# Patient Record
Sex: MALE | Race: WHITE | NOT HISPANIC OR LATINO | Employment: OTHER | URBAN - METROPOLITAN AREA
[De-identification: names, ages, dates, MRNs, and addresses within clinical notes are randomized per-mention and may not be internally consistent; named-entity substitution may affect disease eponyms.]

---

## 2022-10-19 ENCOUNTER — HOSPITAL ENCOUNTER (EMERGENCY)
Facility: HOSPITAL | Age: 32
Discharge: HOME | End: 2022-10-19
Attending: EMERGENCY MEDICINE
Payer: OTHER GOVERNMENT

## 2022-10-19 VITALS
WEIGHT: 175 LBS | SYSTOLIC BLOOD PRESSURE: 137 MMHG | DIASTOLIC BLOOD PRESSURE: 84 MMHG | TEMPERATURE: 98 F | RESPIRATION RATE: 20 BRPM | OXYGEN SATURATION: 98 % | HEART RATE: 88 BPM

## 2022-10-19 DIAGNOSIS — N28.1 RENAL CYST: Primary | ICD-10-CM

## 2022-10-19 DIAGNOSIS — S76.011A HIP STRAIN, RIGHT, INITIAL ENCOUNTER: ICD-10-CM

## 2022-10-19 DIAGNOSIS — S22.31XA CLOSED FRACTURE OF ONE RIB OF RIGHT SIDE, INITIAL ENCOUNTER: ICD-10-CM

## 2022-10-19 DIAGNOSIS — S16.1XXA CERVICAL STRAIN, ACUTE, INITIAL ENCOUNTER: ICD-10-CM

## 2022-10-19 LAB
ANION GAP SERPL CALC-SCNC: 17 MMOL/L (ref 8–16)
BUN SERPL-MCNC: 17 MG/DL (ref 6–30)
CHLORIDE SERPL-SCNC: 102 MMOL/L (ref 95–110)
CREAT SERPL-MCNC: 1.2 MG/DL (ref 0.5–1.4)
GLUCOSE SERPL-MCNC: 91 MG/DL (ref 70–110)
HCT VFR BLD CALC: 45 %PCV (ref 36–54)
POC IONIZED CALCIUM: 1.33 MMOL/L (ref 1.06–1.42)
POC TCO2 (MEASURED): 29 MMOL/L (ref 23–29)
POTASSIUM BLD-SCNC: 3.9 MMOL/L (ref 3.5–5.1)
SAMPLE: ABNORMAL
SODIUM BLD-SCNC: 143 MMOL/L (ref 136–145)

## 2022-10-19 PROCEDURE — 25500020 PHARM REV CODE 255: Performed by: EMERGENCY MEDICINE

## 2022-10-19 PROCEDURE — 99900035 HC TECH TIME PER 15 MIN (STAT)

## 2022-10-19 PROCEDURE — 85014 HEMATOCRIT: CPT

## 2022-10-19 PROCEDURE — 82565 ASSAY OF CREATININE: CPT

## 2022-10-19 PROCEDURE — 82330 ASSAY OF CALCIUM: CPT

## 2022-10-19 PROCEDURE — 84295 ASSAY OF SERUM SODIUM: CPT

## 2022-10-19 PROCEDURE — 84132 ASSAY OF SERUM POTASSIUM: CPT

## 2022-10-19 PROCEDURE — 99285 EMERGENCY DEPT VISIT HI MDM: CPT | Mod: 25

## 2022-10-19 RX ORDER — IBUPROFEN 600 MG/1
600 TABLET ORAL EVERY 6 HOURS PRN
Qty: 20 TABLET | Refills: 0 | Status: ON HOLD | OUTPATIENT
Start: 2022-10-19 | End: 2024-01-18 | Stop reason: HOSPADM

## 2022-10-19 RX ORDER — HYDROCODONE BITARTRATE AND ACETAMINOPHEN 5; 325 MG/1; MG/1
1 TABLET ORAL EVERY 4 HOURS PRN
Qty: 12 TABLET | Refills: 0 | Status: SHIPPED | OUTPATIENT
Start: 2022-10-19 | End: 2022-10-29

## 2022-10-19 RX ADMIN — IOHEXOL 100 ML: 350 INJECTION, SOLUTION INTRAVENOUS at 04:10
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
## 2022-10-19 NOTE — DISCHARGE INSTRUCTIONS
Please follow-up with the orthopedic surgeon above.  Please follow-up with the clinic above.  You have a renal cyst, possible right rib fracture, but no it significant intra-abdominal trauma.  There were no abnormalities noted related to your hip.  Ibuprofen and Tylenol with hydrocodone for pain.  Rest.

## 2022-11-22 ENCOUNTER — TELEPHONE (OUTPATIENT)
Dept: SPORTS MEDICINE | Facility: CLINIC | Age: 32
End: 2022-11-22
Payer: OTHER GOVERNMENT
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
## 2023-03-24 ENCOUNTER — HOSPITAL ENCOUNTER (EMERGENCY)
Facility: HOSPITAL | Age: 33
Discharge: HOME | End: 2023-03-24
Attending: EMERGENCY MEDICINE
Payer: OTHER GOVERNMENT

## 2023-03-24 VITALS
SYSTOLIC BLOOD PRESSURE: 130 MMHG | OXYGEN SATURATION: 100 % | RESPIRATION RATE: 16 BRPM | DIASTOLIC BLOOD PRESSURE: 77 MMHG | HEART RATE: 77 BPM | TEMPERATURE: 97 F | BODY MASS INDEX: 25.9 KG/M2 | WEIGHT: 185 LBS | HEIGHT: 71 IN

## 2023-03-24 DIAGNOSIS — Z87.39 HISTORY OF HERNIATED INTERVERTEBRAL DISC: ICD-10-CM

## 2023-03-24 DIAGNOSIS — G89.29 ACUTE EXACERBATION OF CHRONIC LOW BACK PAIN: Primary | ICD-10-CM

## 2023-03-24 DIAGNOSIS — M54.50 ACUTE EXACERBATION OF CHRONIC LOW BACK PAIN: Primary | ICD-10-CM

## 2023-03-24 DIAGNOSIS — M47.816 FACET ARTHRITIS OF LUMBAR REGION: ICD-10-CM

## 2023-03-24 PROBLEM — F32.A DEPRESSION, UNSPECIFIED: Status: ACTIVE | Noted: 2023-02-16

## 2023-03-24 PROCEDURE — 99284 EMERGENCY DEPT VISIT MOD MDM: CPT

## 2023-03-24 PROCEDURE — 63600175 PHARM REV CODE 636 W HCPCS: Performed by: PHYSICIAN ASSISTANT

## 2023-03-24 PROCEDURE — 96372 THER/PROPH/DIAG INJ SC/IM: CPT | Performed by: PHYSICIAN ASSISTANT

## 2023-03-24 RX ORDER — KETOROLAC TROMETHAMINE 30 MG/ML
30 INJECTION, SOLUTION INTRAMUSCULAR; INTRAVENOUS
Status: COMPLETED | OUTPATIENT
Start: 2023-03-24 | End: 2023-03-24

## 2023-03-24 RX ORDER — PREDNISONE 20 MG/1
60 TABLET ORAL DAILY
Qty: 9 TABLET | Refills: 0 | Status: SHIPPED | OUTPATIENT
Start: 2023-03-24 | End: 2023-03-27

## 2023-03-24 RX ORDER — HYDROCODONE BITARTRATE AND ACETAMINOPHEN 5; 325 MG/1; MG/1
1 TABLET ORAL EVERY 6 HOURS PRN
Qty: 12 TABLET | Refills: 0 | Status: SHIPPED | OUTPATIENT
Start: 2023-03-24 | End: 2023-03-27

## 2023-03-24 RX ORDER — MELOXICAM 7.5 MG/1
7.5 TABLET ORAL DAILY
Qty: 12 TABLET | Refills: 0 | Status: SHIPPED | OUTPATIENT
Start: 2023-03-24

## 2023-03-24 RX ORDER — PREDNISONE 20 MG/1
60 TABLET ORAL
Status: COMPLETED | OUTPATIENT
Start: 2023-03-24 | End: 2023-03-24

## 2023-03-24 RX ADMIN — PREDNISONE 60 MG: 20 TABLET ORAL at 11:03

## 2023-03-24 RX ADMIN — KETOROLAC TROMETHAMINE 30 MG: 30 INJECTION, SOLUTION INTRAMUSCULAR at 11:03
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
## 2023-03-24 NOTE — DISCHARGE INSTRUCTIONS
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
## 2023-04-20 ENCOUNTER — OFFICE VISIT (OUTPATIENT)
Dept: SURGERY | Facility: CLINIC | Age: 33
End: 2023-04-20
Payer: OTHER GOVERNMENT

## 2023-04-20 VITALS
BODY MASS INDEX: 26.78 KG/M2 | DIASTOLIC BLOOD PRESSURE: 76 MMHG | HEART RATE: 65 BPM | WEIGHT: 191.31 LBS | SYSTOLIC BLOOD PRESSURE: 117 MMHG | HEIGHT: 71 IN

## 2023-04-20 DIAGNOSIS — Z87.2 HISTORY OF PILONIDAL CYST: ICD-10-CM

## 2023-04-20 DIAGNOSIS — K62.89 ANAL PAIN: Primary | ICD-10-CM

## 2023-04-20 PROCEDURE — 99203 OFFICE O/P NEW LOW 30 MIN: CPT | Mod: S$PBB,,, | Performed by: NURSE PRACTITIONER

## 2023-04-20 PROCEDURE — 99213 OFFICE O/P EST LOW 20 MIN: CPT | Mod: PBBFAC | Performed by: NURSE PRACTITIONER

## 2023-04-20 PROCEDURE — 99999 PR PBB SHADOW E&M-EST. PATIENT-LVL III: ICD-10-PCS | Mod: PBBFAC,,, | Performed by: NURSE PRACTITIONER

## 2023-04-20 PROCEDURE — 99999 PR PBB SHADOW E&M-EST. PATIENT-LVL III: CPT | Mod: PBBFAC,,, | Performed by: NURSE PRACTITIONER

## 2023-04-20 PROCEDURE — 99203 PR OFFICE/OUTPT VISIT, NEW, LEVL III, 30-44 MIN: ICD-10-PCS | Mod: S$PBB,,, | Performed by: NURSE PRACTITIONER

## 2023-04-20 RX ORDER — BUPROPION HYDROCHLORIDE 150 MG/1
TABLET ORAL
COMMUNITY
Start: 2022-04-25 | End: 2023-12-19

## 2023-04-21 ENCOUNTER — OFFICE VISIT (OUTPATIENT)
Dept: SURGERY | Facility: CLINIC | Age: 33
End: 2023-04-21
Payer: OTHER GOVERNMENT

## 2023-04-21 VITALS
HEART RATE: 92 BPM | WEIGHT: 192.81 LBS | BODY MASS INDEX: 26.99 KG/M2 | SYSTOLIC BLOOD PRESSURE: 136 MMHG | DIASTOLIC BLOOD PRESSURE: 72 MMHG | HEIGHT: 71 IN

## 2023-04-21 DIAGNOSIS — K64.9 HEMORRHOIDS, UNSPECIFIED HEMORRHOID TYPE: Primary | ICD-10-CM

## 2023-04-21 DIAGNOSIS — K59.00 CONSTIPATION, UNSPECIFIED CONSTIPATION TYPE: ICD-10-CM

## 2023-04-21 DIAGNOSIS — K60.3 FISTULA-IN-ANO: ICD-10-CM

## 2023-04-21 DIAGNOSIS — K60.2 ANAL FISSURE: Primary | ICD-10-CM

## 2023-04-21 PROBLEM — K60.30 FISTULA-IN-ANO: Status: ACTIVE | Noted: 2023-04-21

## 2023-04-21 PROCEDURE — 99999 PR PBB SHADOW E&M-EST. PATIENT-LVL III: CPT | Mod: PBBFAC,,, | Performed by: SURGERY

## 2023-04-21 PROCEDURE — 99213 OFFICE O/P EST LOW 20 MIN: CPT | Mod: PBBFAC | Performed by: SURGERY

## 2023-04-21 PROCEDURE — 99999 PR PBB SHADOW E&M-EST. PATIENT-LVL III: ICD-10-PCS | Mod: PBBFAC,,, | Performed by: SURGERY

## 2023-04-21 PROCEDURE — 99214 PR OFFICE/OUTPT VISIT, EST, LEVL IV, 30-39 MIN: ICD-10-PCS | Mod: S$PBB,,, | Performed by: SURGERY

## 2023-04-21 PROCEDURE — 99214 OFFICE O/P EST MOD 30 MIN: CPT | Mod: S$PBB,,, | Performed by: SURGERY

## 2023-04-21 NOTE — PROGRESS NOTES
"CRS Office Visit History and Physical    Referring Md:   No referring provider defined for this encounter.    SUBJECTIVE:     Chief Complaint: anal pain     History of Present Illness:  The patient is an established patient to this practice.   Course is as follows:  Jasson Velasco is a 33 y.o. male presents with severe anal pain.  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  Reports a burning pain that becomes more severe with bowel movements.  Was initially constipated but that has resolved with stool softeners.  Reports a history of pilonidal abscess that has required I&D.  Denies history of crohns.     Last Colonoscopy: NA    Review of patient's allergies indicates:  No Known Allergies      Social History     Tobacco Use    Smoking status: Never    Smokeless tobacco: Never   Substance Use Topics    Alcohol use: Not Currently    Drug use: Never        Review of Systems:  Review of Systems   All other systems reviewed and are negative.    OBJECTIVE:     Vital Signs (Most Recent)  /72 (BP Location: Left arm, Patient Position: Sitting)   Pulse 92   Ht 5' 11" (1.803 m)   Wt 87.5 kg (192 lb 12.7 oz)   BMI 26.89 kg/m²     Physical Exam:  General: 33 y.o. male in no distress   Neuro: alert and oriented x 4.  Moves all extremities.     HEENT: normocephalic, atraumatic, PERRL, EOMI   Respiratory: respirations are even and unlabored  Cardiac: regular rate and rhythm  Abdomen: soft, NTND  Extremities: Warm dry and intact  Skin: no rashes  Anorectal: hypertrophied anal skin tags, well healed scar in right posterolateral position, small punctate area concerning for fistula in ano in the right lateral position, possible fissure in the left posterolateral position      Labs: NA    Imaging: NA      ASSESSMENT/PLAN:     There are no diagnoses linked to this encounter.    33 y.o. male with multiple perianal abnormalities    - patient has an abnormal anal exam with " multiple perianal skin tags, concern for fistula in ano, and possible off midline anal fissure.    - He does have a history of intermittent swelling and drainage in the same location that occurs every 4-5 months that is consistent with history of fistula in ano.  The scar from his reported pilonidal disease is more consistent with a perirectal abscess as he has no pitting of his midline cleft.    - Will treat anal fissure with diltiazem.  If pain improves, will plan for colonoscopy to better assess for possible IBD  - follow up 5/12.    - avoid constipation, daily fiber supplement, increased water intake    Ronda Feliciano MD  Staff Surgeon  Colon & Rectal Surgery

## 2023-05-11 NOTE — H&P (VIEW-ONLY)
Colon & Rectal Surgery Clinic Follow Up    HPI:   Jasson Velasco is a 33 y.o. male who presents for follow up of anal fissure, concern for fistula in ano and abdominal pain.  Reports that anal pain is significantly improved.  No bleeding.  Bowel movements improved.  Still having lower abdominal pain.       Objective:   Vitals:    05/12/23 1344   BP: 124/60   Pulse: 73        Physical Exam   General: 33 y.o. male in no distress   Neuro: alert and oriented x 4.  Moves all extremities.     HEENT: normocephalic, atraumatic, PERRL, EOMI   Respiratory: respirations are even and unlabored  Cardiac: regular rate and rhythm  Abdomen: soft, NTND  Extremities: Warm dry and intact  Skin: no rashes  Anorectal: hypertrophied anal skin tags, well healed scar in right posterolateral position, fissure resolved     Assessment and Plan:   Jasson Velasco  is a 33 y.o. male who presents for follow up of anorectal abnormalities and abdominal pain     - We reviewed diagnoses.  He had an anal fissure that has resolved.  He is not having any drainage from site of prior abscess.  He has multiple hypertrophied anal skin tags and vague abdominal pain.    - Given his multiple anorectal abnormalities and abdominal pain, I am concerned for possible underlying inflammatory bowel disease.   - We discussed work up and will proceed with diagnostic colonoscopy to better assess.  - 5/25 at [facility].        Ronda Feliciano MD  Staff Surgeon   Colon & Rectal Surgery

## 2023-05-11 NOTE — PROGRESS NOTES
Colon & Rectal Surgery Clinic Follow Up    HPI:   Jasson Velasco is a 33 y.o. male who presents for follow up of anal fissure, concern for fistula in ano and abdominal pain.  Reports that anal pain is significantly improved.  No bleeding.  Bowel movements improved.  Still having lower abdominal pain.       Objective:   Vitals:    05/12/23 1344   BP: 124/60   Pulse: 73        Physical Exam   General: 33 y.o. male in no distress   Neuro: alert and oriented x 4.  Moves all extremities.     HEENT: normocephalic, atraumatic, PERRL, EOMI   Respiratory: respirations are even and unlabored  Cardiac: regular rate and rhythm  Abdomen: soft, NTND  Extremities: Warm dry and intact  Skin: no rashes  Anorectal: hypertrophied anal skin tags, well healed scar in right posterolateral position, fissure resolved     Assessment and Plan:   Jasson Velasco  is a 33 y.o. male who presents for follow up of anorectal abnormalities and abdominal pain     - We reviewed diagnoses.  He had an anal fissure that has resolved.  He is not having any drainage from site of prior abscess.  He has multiple hypertrophied anal skin tags and vague abdominal pain.    - Given his multiple anorectal abnormalities and abdominal pain, I am concerned for possible underlying inflammatory bowel disease.   - We discussed work up and will proceed with diagnostic colonoscopy to better assess.  - 5/25 at [facility].        Ronda Feliciano MD  Staff Surgeon   Colon & Rectal Surgery

## 2023-05-12 ENCOUNTER — OFFICE VISIT (OUTPATIENT)
Dept: SURGERY | Facility: CLINIC | Age: 33
End: 2023-05-12
Payer: OTHER GOVERNMENT

## 2023-05-12 VITALS
WEIGHT: 193.81 LBS | BODY MASS INDEX: 27.13 KG/M2 | DIASTOLIC BLOOD PRESSURE: 60 MMHG | SYSTOLIC BLOOD PRESSURE: 124 MMHG | HEART RATE: 73 BPM | HEIGHT: 71 IN

## 2023-05-12 DIAGNOSIS — K62.89 ANAL PAIN: Primary | ICD-10-CM

## 2023-05-12 PROCEDURE — 99213 OFFICE O/P EST LOW 20 MIN: CPT | Mod: PBBFAC | Performed by: SURGERY

## 2023-05-12 PROCEDURE — 99213 PR OFFICE/OUTPT VISIT, EST, LEVL III, 20-29 MIN: ICD-10-PCS | Mod: S$PBB,,, | Performed by: SURGERY

## 2023-05-12 PROCEDURE — 99999 PR PBB SHADOW E&M-EST. PATIENT-LVL III: ICD-10-PCS | Mod: PBBFAC,,, | Performed by: SURGERY

## 2023-05-12 PROCEDURE — 99213 OFFICE O/P EST LOW 20 MIN: CPT | Mod: S$PBB,,, | Performed by: SURGERY

## 2023-05-12 PROCEDURE — 99999 PR PBB SHADOW E&M-EST. PATIENT-LVL III: CPT | Mod: PBBFAC,,, | Performed by: SURGERY

## 2023-05-12 RX ORDER — MELOXICAM 15 MG/1
15 TABLET ORAL DAILY PRN
COMMUNITY
Start: 2023-04-13

## 2023-05-24 ENCOUNTER — TELEPHONE (OUTPATIENT)
Dept: SURGERY | Facility: CLINIC | Age: 33
End: 2023-05-24
Payer: OTHER GOVERNMENT

## 2023-05-24 NOTE — TELEPHONE ENCOUNTER
Pt confirmed his arrival time of 0930 for his colonoscopy at [facility] with Dr. Feliciano tomorrow.  Prep and instructions reviewed.  RN stressed the importance of no solids and an all clear liquid diet for today.  Pt instructed pt to purchase Dulcolax and Miralax prep to start promptly by 12pm.  Pt stated that he lost his paper he was given with instructions to follow. All questions and concerns addressed.  Pt verbalized understanding to all.

## 2023-05-25 ENCOUNTER — ANESTHESIA EVENT (OUTPATIENT)
Dept: ENDOSCOPY | Facility: OTHER | Age: 33
End: 2023-05-25
Payer: OTHER GOVERNMENT

## 2023-05-25 ENCOUNTER — ANESTHESIA (OUTPATIENT)
Dept: ENDOSCOPY | Facility: OTHER | Age: 33
End: 2023-05-25
Payer: OTHER GOVERNMENT

## 2023-05-25 ENCOUNTER — HOSPITAL ENCOUNTER (OUTPATIENT)
Facility: OTHER | Age: 33
Discharge: HOME | End: 2023-05-25
Attending: SURGERY | Admitting: SURGERY
Payer: OTHER GOVERNMENT

## 2023-05-25 VITALS
RESPIRATION RATE: 16 BRPM | HEIGHT: 71 IN | BODY MASS INDEX: 27.02 KG/M2 | WEIGHT: 193 LBS | DIASTOLIC BLOOD PRESSURE: 82 MMHG | HEART RATE: 47 BPM | OXYGEN SATURATION: 99 % | SYSTOLIC BLOOD PRESSURE: 124 MMHG | TEMPERATURE: 97 F

## 2023-05-25 DIAGNOSIS — Z13.9 SCREENING DUE: ICD-10-CM

## 2023-05-25 PROCEDURE — 25000003 PHARM REV CODE 250: Performed by: STUDENT IN AN ORGANIZED HEALTH CARE EDUCATION/TRAINING PROGRAM

## 2023-05-25 PROCEDURE — 45380 COLONOSCOPY AND BIOPSY: CPT | Performed by: SURGERY

## 2023-05-25 PROCEDURE — D9220A PRA ANESTHESIA: ICD-10-PCS | Mod: ,,, | Performed by: STUDENT IN AN ORGANIZED HEALTH CARE EDUCATION/TRAINING PROGRAM

## 2023-05-25 PROCEDURE — 45380 PR COLONOSCOPY,BIOPSY: ICD-10-PCS | Mod: ,,, | Performed by: SURGERY

## 2023-05-25 PROCEDURE — 88305 TISSUE EXAM BY PATHOLOGIST: CPT | Mod: 26,,, | Performed by: PATHOLOGY

## 2023-05-25 PROCEDURE — 27201012 HC FORCEPS, HOT/COLD, DISP: Performed by: SURGERY

## 2023-05-25 PROCEDURE — D9220A PRA ANESTHESIA: Mod: ,,, | Performed by: STUDENT IN AN ORGANIZED HEALTH CARE EDUCATION/TRAINING PROGRAM

## 2023-05-25 PROCEDURE — 45380 COLONOSCOPY AND BIOPSY: CPT | Mod: ,,, | Performed by: SURGERY

## 2023-05-25 PROCEDURE — 37000009 HC ANESTHESIA EA ADD 15 MINS: Performed by: SURGERY

## 2023-05-25 PROCEDURE — 88305 TISSUE EXAM BY PATHOLOGIST: ICD-10-PCS | Mod: 26,,, | Performed by: PATHOLOGY

## 2023-05-25 PROCEDURE — 00811 ANES LWR INTST NDSC NOS: CPT | Performed by: SURGERY

## 2023-05-25 PROCEDURE — 63600175 PHARM REV CODE 636 W HCPCS: Performed by: STUDENT IN AN ORGANIZED HEALTH CARE EDUCATION/TRAINING PROGRAM

## 2023-05-25 PROCEDURE — 37000008 HC ANESTHESIA 1ST 15 MINUTES: Performed by: SURGERY

## 2023-05-25 PROCEDURE — 88305 TISSUE EXAM BY PATHOLOGIST: CPT | Performed by: PATHOLOGY

## 2023-05-25 PROCEDURE — 63600175 PHARM REV CODE 636 W HCPCS: Performed by: ANESTHESIOLOGY

## 2023-05-25 RX ORDER — MEPERIDINE HYDROCHLORIDE 25 MG/ML
12.5 INJECTION INTRAMUSCULAR; INTRAVENOUS; SUBCUTANEOUS ONCE AS NEEDED
Status: DISCONTINUED | OUTPATIENT
Start: 2023-05-25 | End: 2023-05-26 | Stop reason: HOSPADM

## 2023-05-25 RX ORDER — SODIUM CHLORIDE 0.9 % (FLUSH) 0.9 %
3 SYRINGE (ML) INJECTION
Status: DISCONTINUED | OUTPATIENT
Start: 2023-05-25 | End: 2023-05-26 | Stop reason: HOSPADM

## 2023-05-25 RX ORDER — LIDOCAINE HYDROCHLORIDE 20 MG/ML
INJECTION INTRAVENOUS
Status: DISCONTINUED | OUTPATIENT
Start: 2023-05-25 | End: 2023-05-25

## 2023-05-25 RX ORDER — PROPOFOL 10 MG/ML
VIAL (ML) INTRAVENOUS CONTINUOUS PRN
Status: DISCONTINUED | OUTPATIENT
Start: 2023-05-25 | End: 2023-05-25

## 2023-05-25 RX ORDER — PROPOFOL 10 MG/ML
VIAL (ML) INTRAVENOUS
Status: DISCONTINUED | OUTPATIENT
Start: 2023-05-25 | End: 2023-05-25

## 2023-05-25 RX ORDER — OXYCODONE HYDROCHLORIDE 5 MG/1
5 TABLET ORAL
Status: DISCONTINUED | OUTPATIENT
Start: 2023-05-25 | End: 2023-05-26 | Stop reason: HOSPADM

## 2023-05-25 RX ORDER — HYDROMORPHONE HYDROCHLORIDE 2 MG/ML
0.4 INJECTION, SOLUTION INTRAMUSCULAR; INTRAVENOUS; SUBCUTANEOUS EVERY 5 MIN PRN
Status: DISCONTINUED | OUTPATIENT
Start: 2023-05-25 | End: 2023-05-26 | Stop reason: HOSPADM

## 2023-05-25 RX ORDER — PROCHLORPERAZINE EDISYLATE 5 MG/ML
5 INJECTION INTRAMUSCULAR; INTRAVENOUS EVERY 30 MIN PRN
Status: DISCONTINUED | OUTPATIENT
Start: 2023-05-25 | End: 2023-05-26 | Stop reason: HOSPADM

## 2023-05-25 RX ADMIN — SODIUM CHLORIDE, SODIUM LACTATE, POTASSIUM CHLORIDE, AND CALCIUM CHLORIDE: .6; .31; .03; .02 INJECTION, SOLUTION INTRAVENOUS at 10:05

## 2023-05-25 RX ADMIN — LIDOCAINE HYDROCHLORIDE 60 MG: 20 INJECTION, SOLUTION INTRAVENOUS at 10:05

## 2023-05-25 RX ADMIN — PROPOFOL 300 MCG/KG/MIN: 10 INJECTION, EMULSION INTRAVENOUS at 10:05

## 2023-05-25 RX ADMIN — PROPOFOL 100 MG: 10 INJECTION, EMULSION INTRAVENOUS at 10:05

## 2023-05-25 NOTE — PLAN OF CARE
Jasson Velasco has met all discharge criteria from Phase II. Vital Signs are stable, ambulating  without difficulty. Discharge instructions given, patient verbalized understanding. Discharged from facility via wheelchair in stable condition.

## 2023-05-25 NOTE — ANESTHESIA PREPROCEDURE EVALUATION
05/25/2023  Jasson Velasco is a 33 y.o., male.      Pre-op Assessment    I have reviewed the Patient Summary Reports.     I have reviewed the Nursing Notes.    I have reviewed the Medications.     Review of Systems  Anesthesia Hx:  No problems with previous Anesthesia  Denies Family Hx of Anesthesia complications.   Denies Personal Hx of Anesthesia complications.   Social:  Non-Smoker    Hematology/Oncology:  Hematology Normal   Oncology Normal     EENT/Dental:EENT/Dental Normal   Cardiovascular:  Cardiovascular Normal Exercise tolerance: good     Pulmonary:  Pulmonary Normal    Renal/:  Renal/ Normal     Hepatic/GI:   Possible Chrons   Musculoskeletal:  Musculoskeletal Normal    Neurological:  Neurology Normal    Endocrine:  Endocrine Normal    Dermatological:  Skin Normal    Psych:   depression          Physical Exam  General: Well nourished, Cooperative, Oriented and Alert    Airway:  Mouth Opening: Normal  TM Distance: Normal  Neck ROM: Normal ROM    Dental:  Intact        Anesthesia Plan  Type of Anesthesia, risks & benefits discussed:    Anesthesia Type: Gen Natural Airway  Intra-op Monitoring Plan: Standard ASA Monitors  Post Op Pain Control Plan: multimodal analgesia  Induction:  IV  Airway Plan: Video and Direct  Informed Consent: Informed consent signed with the Patient and all parties understand the risks and agree with anesthesia plan.  All questions answered.   ASA Score: 1  Day of Surgery Review of History & Physical: H&P Update referred to the surgeon/provider.    Ready For Surgery From Anesthesia Perspective.     .

## 2023-05-25 NOTE — ANESTHESIA POSTPROCEDURE EVALUATION
Anesthesia Post Evaluation    Patient: Jasson Velasco    Procedure(s) Performed: Procedure(s) (LRB):  COLONOSCOPY (N/A)    Final Anesthesia Type: general      Patient location during evaluation: [facility]  Patient participation: Yes- Able to Participate  Level of consciousness: awake and awake and alert  Post-procedure vital signs: reviewed and stable  Pain management: adequate  Airway patency: patent    PONV status at discharge: No PONV  Anesthetic complications: no      Cardiovascular status: blood pressure returned to baseline and hemodynamically stable  Respiratory status: unassisted and room air  Hydration status: euvolemic  Follow-up not needed.          Vitals Value Taken Time   /64 05/25/23 1122   Temp 98 05/25/23 1122   Pulse 77 05/25/23 1122   Resp 18 05/25/23 1122   SpO2 98 05/25/23 1122         No case tracking events are documented in the log.      Pain/Guille Score: No data recorded

## 2023-05-25 NOTE — DISCHARGE INSTRUCTIONS
Patient prefers to have La (wife) present for discharge. Jasson Velasco has met all discharge criteria from Phase II. Vital Signs are stable, ambulating  without difficulty. Discharge instructions given, patient verbalized understanding. Discharged from facility via wheelchair in stable condition.

## 2023-05-25 NOTE — DISCHARGE SUMMARY
[facility] - Endoscopy  Discharge Note  Short Stay    Procedure(s) (LRB):  COLONOSCOPY (N/A)      OUTCOME: Patient tolerated treatment/procedure well without complication and is now ready for discharge.    DISPOSITION: Home or Self Care    FINAL DIAGNOSIS:  colonoscopy, rule out inflammatory bowel disease    FOLLOWUP: In clinic    DISCHARGE INSTRUCTIONS:    Resume diet and activity as tolerated  Await path results     Ronda Feliciano MD  Staff Surgeon   Colon & Rectal Surgery

## 2023-05-25 NOTE — [employer] PATIENT INSTRUCTIONS
Discharge Summary/Instructions after an Endoscopic Procedure  Patient Name: Jasson Velasco  Patient MRN: [id number]  Patient YOB: 1990  Thursday, May 25, 2023  Ronda Feliciano MD  RESTRICTIONS:  During your procedure today, you received medications for sedation.  These   medications may affect your judgment, balance and coordination.  Therefore,   for 24 hours, you have the following restrictions:   - DO NOT drive a car, operate machinery, make legal/financial decisions,   sign important papers or drink alcohol.    ACTIVITY:  Today: no heavy lifting, straining or running due to procedural   sedation/anesthesia.  The following day: return to full activity including work.  DIET:  Eat and drink normally unless instructed otherwise.     TREATMENT FOR COMMON SIDE EFFECTS:  - Mild abdominal pain, nausea, belching, bloating or excessive gas:  rest,   eat lightly and use a heating pad.  - Sore Throat: treat with throat lozenges and/or gargle with warm salt   water.  - Because air was used during the procedure, expelling large amounts of air   from your rectum or belching is normal.  - If a bowel prep was taken, you may not have a bowel movement for 1-3 days.    This is normal.  SYMPTOMS TO WATCH FOR AND REPORT TO YOUR PHYSICIAN:  1. Abdominal pain or bloating, other than gas cramps.  2. Chest pain.  3. Back pain.  4. Signs of infection such as: chills or fever occurring within 24 hours   after the procedure.  5. Rectal bleeding, which would show as bright red, maroon, or black stools.   (A tablespoon of blood from the rectum is not serious, especially if   hemorrhoids are present.)  6. Vomiting.  7. Weakness or dizziness.  GO DIRECTLY TO THE NEAREST EMERGENCY ROOM IF YOU HAVE ANY OF THE FOLLOWING:      Difficulty breathing              Chills and/or fever over 101 F   Persistent vomiting and/or vomiting blood   Severe abdominal pain   Severe chest pain   Black, tarry stools   Bleeding- more than one tablespoon   Any  other symptom or condition that you feel may need urgent attention  Your doctor recommends these additional instructions:  If any biopsies were taken, your doctors clinic will contact you in 1 to 2   weeks with any results.  - Patient has a contact number available for emergencies.  The signs and   symptoms of potential delayed complications were discussed with the   patient.  Return to normal activities tomorrow.  Written discharge   instructions were provided to the patient.   - Resume previous diet.   - Continue present medications.   - Discharge patient to home (ambulatory).   - Repeat colonoscopy for surveillance based on pathology results.  For questions, problems or results please call your physician - Ronda Feliciano MD at Work:  [phone].  [facility], EMERGENCY ROOM PHONE NUMBER: [phone], [facility]   [phone].  IF A COMPLICATION OR EMERGENCY SITUATION ARISES AND YOU ARE UNABLE TO REACH   YOUR PHYSICIAN - GO DIRECTLY TO THE EMERGENCY ROOM.  MD Ronda Rain MD  5/25/2023 11:13:26 AM  This report has been verified and signed electronically.  Dear patient,  As a result of recent federal legislation (The Federal Cures Act), you may   receive lab or pathology results from your procedure in your [facility]   account before your physician is able to contact you. Your physician or   their representative will relay the results to you with their   recommendations at their soonest availability.  Thank you,  [employer]

## 2023-05-31 ENCOUNTER — TELEPHONE (OUTPATIENT)
Dept: SURGERY | Facility: CLINIC | Age: 33
End: 2023-05-31
Payer: OTHER GOVERNMENT

## 2023-05-31 ENCOUNTER — TELEPHONE (OUTPATIENT)
Dept: SURGERY | Facility: OTHER | Age: 33
End: 2023-05-31
Payer: OTHER GOVERNMENT

## 2023-05-31 DIAGNOSIS — K52.9 INFLAMMATORY BOWEL DISEASE: Primary | ICD-10-CM

## 2023-05-31 LAB
FINAL PATHOLOGIC DIAGNOSIS: NORMAL
GROSS: NORMAL
Lab: NORMAL

## 2023-05-31 NOTE — TELEPHONE ENCOUNTER
Called pt per Dr. Feliciano's request to get him set up for lab work and a future clinic appt.  Pt also has an external referral placed for GI.  RN left pt a vmail to contact our offices when possible.  Dr. Feliciano and Nurse Sarkar aware.

## 2023-05-31 NOTE — TELEPHONE ENCOUNTER
Called patient regarding results of biopsy, concerning for crohns disease.  Left voicemail regarding need for labs, GI referral.  Referral to Dr. Frederick at [facility].     Ronda Feliciano MD  Staff Surgeon   Colon & Rectal Surgery

## 2023-06-01 ENCOUNTER — TELEPHONE (OUTPATIENT)
Dept: SURGERY | Facility: CLINIC | Age: 33
End: 2023-06-01
Payer: OTHER GOVERNMENT

## 2023-06-01 NOTE — TELEPHONE ENCOUNTER
Attempted to contact pt to schedule lab work and f/u appt to see Dr. Feliciano. Left voicemail. CRS number provided to return call.       ----- Message from Keith Carlos RN sent at 5/31/2023  4:13 PM CDT -----  Regarding: Left pt a vmail  Hi Dr. Feliciano,    Hope you had a good day.  I received your message today. I did try and reach out to this pt to get him set up and scheduled, but I had to leave him a vmail as well.  I copied Antonina on this message to follow up with him.    Thank you,    Keith  ----- Message -----  From: Ronda Feliciano MD  Sent: 5/31/2023  11:07 AM CDT  To: [facility]od[facility] Staff    I called Mr. Velasco to review biopsy results.  Left a voicemail about needing labs and referral to GI for crohns work up.  I placed outside referral to Dr. Frederick at [facility].  Needs to go to lab for CRP and fecal calprotectin.  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

## 2023-07-12 NOTE — ADDENDUM NOTE
Addendum  created 07/12/23 1856 by Marshal Russell MD    Attestation recorded in Intraprocedure, Intraprocedure Attestations filed, Intraprocedure Event edited

## 2023-12-13 ENCOUNTER — TELEPHONE (OUTPATIENT)
Dept: ORTHOPEDICS | Facility: CLINIC | Age: 33
End: 2023-12-13
Payer: OTHER GOVERNMENT

## 2023-12-13 NOTE — TELEPHONE ENCOUNTER
Returned patient's call to inform him that he will need to contact our sports medicine department to schedule appt in that clinic. Number given to patient and verbalized understanding.    ----- Message from Laura Calle MA sent at 12/13/2023  3:36 PM CST -----  Alissa    Sports medicine would be the best place for pt to be seen.  Will need his films on disc for the appointment.    Crystal    ----- Message -----  From: Alissa Corrales LPN  Sent: 12/13/2023   3:31 PM CST  To: MAMADOU Sharma do trauma handle this or do we send to Sports? Patient went to PCP and got MRI which shows a slap tear.    ----- Message -----  From: Comfort Norman  Sent: 12/13/2023   3:20 PM CST  To: [facility] Orth[facility] Clinical Support    Pt calling to see if department can perform a procedure for Slap Tear on shoulder       Confirmed patient's contact info below:  Contact Name: Jasson Velasco  Phone Number: [phone]

## 2023-12-19 ENCOUNTER — HOSPITAL ENCOUNTER (EMERGENCY)
Facility: HOSPITAL | Age: 33
Discharge: HOME | End: 2023-12-19
Attending: EMERGENCY MEDICINE
Payer: OTHER GOVERNMENT

## 2023-12-19 VITALS
OXYGEN SATURATION: 100 % | BODY MASS INDEX: 26.48 KG/M2 | RESPIRATION RATE: 20 BRPM | HEART RATE: 100 BPM | SYSTOLIC BLOOD PRESSURE: 152 MMHG | TEMPERATURE: 98 F | DIASTOLIC BLOOD PRESSURE: 83 MMHG | HEIGHT: 70 IN | WEIGHT: 185 LBS

## 2023-12-19 DIAGNOSIS — J20.9 ACUTE BRONCHITIS, UNSPECIFIED ORGANISM: ICD-10-CM

## 2023-12-19 DIAGNOSIS — R06.02 SOB (SHORTNESS OF BREATH): ICD-10-CM

## 2023-12-19 DIAGNOSIS — J40 BRONCHITIS: Primary | ICD-10-CM

## 2023-12-19 DIAGNOSIS — R00.0 TACHYCARDIA: ICD-10-CM

## 2023-12-19 LAB
CTP QC/QA: YES
CTP QC/QA: YES
POC MOLECULAR INFLUENZA A AGN: NEGATIVE
POC MOLECULAR INFLUENZA B AGN: NEGATIVE
SARS-COV-2 RDRP RESP QL NAA+PROBE: NEGATIVE

## 2023-12-19 PROCEDURE — 87635 SARS-COV-2 COVID-19 AMP PRB: CPT | Performed by: PHYSICIAN ASSISTANT

## 2023-12-19 PROCEDURE — 25000242 PHARM REV CODE 250 ALT 637 W/ HCPCS: Performed by: PHYSICIAN ASSISTANT

## 2023-12-19 PROCEDURE — 94640 AIRWAY INHALATION TREATMENT: CPT

## 2023-12-19 PROCEDURE — 93010 ELECTROCARDIOGRAM REPORT: CPT | Mod: ,,, | Performed by: INTERNAL MEDICINE

## 2023-12-19 PROCEDURE — 99284 EMERGENCY DEPT VISIT MOD MDM: CPT | Mod: 25

## 2023-12-19 PROCEDURE — 93005 ELECTROCARDIOGRAM TRACING: CPT

## 2023-12-19 PROCEDURE — 87502 INFLUENZA DNA AMP PROBE: CPT

## 2023-12-19 PROCEDURE — 93010 EKG 12-LEAD: ICD-10-PCS | Mod: ,,, | Performed by: INTERNAL MEDICINE

## 2023-12-19 PROCEDURE — 63600175 PHARM REV CODE 636 W HCPCS: Performed by: PHYSICIAN ASSISTANT

## 2023-12-19 RX ORDER — BENZONATATE 100 MG/1
100 CAPSULE ORAL 3 TIMES DAILY PRN
Status: DISCONTINUED | OUTPATIENT
Start: 2023-12-19 | End: 2023-12-19 | Stop reason: HOSPADM

## 2023-12-19 RX ORDER — PHENOL 1.4 %
AEROSOL, SPRAY (ML) MUCOUS MEMBRANE
Qty: 177 ML | Refills: 0 | Status: ON HOLD | OUTPATIENT
Start: 2023-12-19 | End: 2024-01-18 | Stop reason: HOSPADM

## 2023-12-19 RX ORDER — ALBUTEROL SULFATE 90 UG/1
1-2 AEROSOL, METERED RESPIRATORY (INHALATION) EVERY 6 HOURS PRN
Qty: 18 G | Refills: 0 | Status: SHIPPED | OUTPATIENT
Start: 2023-12-19 | End: 2024-12-18

## 2023-12-19 RX ORDER — DOXYCYCLINE 100 MG/1
100 CAPSULE ORAL 2 TIMES DAILY
Qty: 14 CAPSULE | Refills: 0 | Status: SHIPPED | OUTPATIENT
Start: 2023-12-19 | End: 2023-12-26

## 2023-12-19 RX ORDER — PREDNISONE 20 MG/1
40 TABLET ORAL
Status: COMPLETED | OUTPATIENT
Start: 2023-12-19 | End: 2023-12-19

## 2023-12-19 RX ORDER — IPRATROPIUM BROMIDE AND ALBUTEROL SULFATE 2.5; .5 MG/3ML; MG/3ML
3 SOLUTION RESPIRATORY (INHALATION)
Status: COMPLETED | OUTPATIENT
Start: 2023-12-19 | End: 2023-12-19

## 2023-12-19 RX ORDER — BENZONATATE 100 MG/1
100 CAPSULE ORAL 3 TIMES DAILY PRN
Qty: 20 CAPSULE | Refills: 0 | Status: SHIPPED | OUTPATIENT
Start: 2023-12-19 | End: 2023-12-29

## 2023-12-19 RX ADMIN — PREDNISONE 40 MG: 20 TABLET ORAL at 05:12

## 2023-12-19 RX ADMIN — IPRATROPIUM BROMIDE AND ALBUTEROL SULFATE 3 ML: 2.5; .5 SOLUTION RESPIRATORY (INHALATION) at 06:12

## 2023-12-19 NOTE — ED TRIAGE NOTES
Pt reports to ED for cough for 3 weeks, states medication is not working. Pt reports being treated at  recently. Pt also reports right inguinal groin pain, constant. Pt report hx of hernia in area and feels like he aggravated it with coughing. Pt denies fever/chills.

## 2023-12-20 NOTE — ED PROVIDER NOTES
"Encounter Date: 12/19/2023    SCRIBE #1 NOTE: I, Max Garcia, am scribing for, and in the presence of,  Villa Kay PA-C. I have scribed the following portions of the note - Other sections scribed: HPI, ROS, PE.       History     Chief Complaint   Patient presents with    Cough     Pt to ER with reports of cough x 3 weeks. Pt denies fever or chills. PT seen at  and prescribed "something for the cough" but its not working     Jasson Velasco is a 33 y.o. male with no known PMHx, presents to the ED for persistent worsening non productive dry cough that started 3 weeks ago. Patient reports of chest discomfort 2/2 the coughing. Patient reports visiting  last week for this same cough. Results showed negative swabs and reports being discharged with Tessalon. States that the tessalon provided no relief. Endorses taking Tylenol today. No other medications taken PTA. No alleviating or exacerbating factors noted. Denies any associated symptoms.      The history is provided by the patient. No  was used.     Review of patient's allergies indicates:  No Known Allergies  History reviewed. No pertinent past medical history.  Past Surgical History:   Procedure Laterality Date    COLONOSCOPY N/A 5/25/2023    Procedure: COLONOSCOPY;  Surgeon: Ronda Feliciano MD;  Location: [facility] [place], [place];  Service: Colon and Rectal;  Laterality: N/A;     History reviewed. No pertinent family history.  Social History     Tobacco Use    Smoking status: Never    Smokeless tobacco: Never   Substance Use Topics    Alcohol use: Not Currently    Drug use: Never     Review of Systems   Constitutional:  Negative for appetite change, chills, diaphoresis, fatigue and fever.   HENT:  Negative for congestion, ear discharge, ear pain, postnasal drip, rhinorrhea, sinus pressure, sneezing, sore throat and voice change.    Eyes:  Negative for discharge, itching and visual disturbance.   Respiratory:  Positive for cough (productive x 3 weeks). " Negative for shortness of breath and wheezing.         (+) Chest discomfort.   Cardiovascular:  Negative for chest pain, palpitations and leg swelling.   Gastrointestinal:  Negative for abdominal pain, nausea and vomiting.   Endocrine: Negative for polydipsia, polyphagia and polyuria.   Genitourinary:  Negative for difficulty urinating, dysuria, frequency, hematuria, penile discharge, penile pain, penile swelling and urgency.   Musculoskeletal:  Negative for arthralgias and myalgias.   Skin:  Negative for rash and wound.   Neurological:  Negative for dizziness, seizures, syncope and weakness.   Hematological:  Negative for adenopathy. Does not bruise/bleed easily.   Psychiatric/Behavioral:  Negative for agitation and self-injury. The patient is not nervous/anxious.        Physical Exam     Initial Vitals [12/19/23 1657]   BP Pulse Resp Temp SpO2   (!) 152/83 (!) 125 (!) 24 97.5 °F (36.4 °C) 98 %      MAP       --         Physical Exam    Nursing note and vitals reviewed.  Constitutional: He appears well-developed and well-nourished. He is not diaphoretic. He does not appear ill. No distress.   HENT:   Head: Normocephalic and atraumatic. Head is without raccoon's eyes and without Morales's sign.   Right Ear: Tympanic membrane, external ear and ear canal normal.   Left Ear: Tympanic membrane, external ear and ear canal normal.   Nose: Nose normal. Right sinus exhibits no maxillary sinus tenderness and no frontal sinus tenderness. Left sinus exhibits no maxillary sinus tenderness and no frontal sinus tenderness.   Mouth/Throat: Uvula is midline, oropharynx is clear and moist and mucous membranes are normal. No trismus in the jaw. No uvula swelling. No oropharyngeal exudate, posterior oropharyngeal edema, posterior oropharyngeal erythema or tonsillar abscesses.   Eyes: Conjunctivae and EOM are normal. Pupils are equal, round, and reactive to light. Right eye exhibits no discharge. Left eye exhibits no discharge. No  scleral icterus.   Neck: Trachea normal. Neck supple.   Normal range of motion.   Full passive range of motion without pain.     Cardiovascular:  Regular rhythm, normal heart sounds, intact distal pulses and normal pulses.   Tachycardia present.         Pulmonary/Chest: Effort normal and breath sounds normal. No accessory muscle usage. No tachypnea and no bradypnea. No respiratory distress. He exhibits no tenderness.   Abdominal: Abdomen is soft. Bowel sounds are normal. He exhibits no distension and no pulsatile midline mass. There is no abdominal tenderness.   No right CVA tenderness.  No left CVA tenderness. There is no rebound and no guarding.   Musculoskeletal:         General: Normal range of motion.      Right shoulder: Normal.      Left shoulder: Normal.      Right elbow: Normal.      Left elbow: Normal.      Right wrist: Normal.      Left wrist: Normal.      Right hand: Normal.      Left hand: Normal.      Cervical back: Normal, full passive range of motion without pain, normal range of motion and neck supple.      Thoracic back: Normal.      Lumbar back: Normal.      Right hip: Normal.      Left hip: Normal.      Right knee: Normal.      Left knee: Normal.      Right lower leg: Normal.      Left lower leg: Normal.      Right ankle: Normal.      Left ankle: Normal.      Right foot: Normal.      Left foot: Normal.     Lymphadenopathy:        Head (right side): No submental, no submandibular, no tonsillar, no preauricular, no posterior auricular and no occipital adenopathy present.        Head (left side): No submental, no submandibular, no tonsillar, no preauricular, no posterior auricular and no occipital adenopathy present.     He has no cervical adenopathy.   Neurological: He is alert and oriented to person, place, and time. He has normal strength. No cranial nerve deficit or sensory deficit. Coordination and gait normal.   Skin: Skin is warm and dry. Capillary refill takes less than 2 seconds. No  bruising, no ecchymosis and no rash noted. No erythema. No pallor.   Psychiatric: He has a normal mood and affect. His speech is normal and behavior is normal. Thought content normal.         ED Course   Procedures  Labs Reviewed   SARS-COV-2 RDRP GENE   POCT INFLUENZA A/B MOLECULAR     EKG Readings: (Independently Interpreted)   Rhythm: Sinus Tachycardia. Ectopy: No Ectopy. Conduction: Normal. ST Segments: Normal ST Segments. T Waves: Normal. Clinical Impression: Sinus Tachycardia   Sinus tach at a rate of 110.  NC interval 126.  .  No evidence of ST elevation no evidence of T-wave inversion.  No arrhythmia.  Seen by Dr. Johnnie green        Imaging Results              X-Ray Chest PA And Lateral (Final result)  Result time 12/19/23 18:28:43      Final result by Zoltan Stephens MD (12/19/23 18:28:43)                   Impression:      1. No acute cardiopulmonary process.      Electronically signed by: Zoltan Stephens MD  Date:    12/19/2023  Time:    18:28               Narrative:    EXAMINATION:  XR CHEST PA AND LATERAL    CLINICAL HISTORY:  Shortness of breath    TECHNIQUE:  PA and lateral views of the chest were performed.    COMPARISON:  None    FINDINGS:  The cardiomediastinal silhouette is not enlarged.  There is no pleural effusion.  The trachea is midline.  The lungs are symmetrically expanded bilaterally without evidence of acute parenchymal process. No large focal consolidation seen.  There is no pneumothorax.  The osseous structures are unremarkable.                                    X-Rays:   Independently Interpreted Readings:   Chest X-Ray: Normal heart size.  No infiltrates.  No acute abnormalities. No infiltrate. No effusion. No pneumothorax. Heart size normal.       Medications   benzonatate capsule 100 mg (has no administration in time range)   albuterol-ipratropium 2.5 mg-0.5 mg/3 mL nebulizer solution 3 mL (3 mLs Nebulization Given 12/19/23 1815)   predniSONE tablet 40 mg (40 mg  Oral Given 12/19/23 7658)     Medical Decision Making  Jasson Velasco is a 33 y.o. male with no known PMHx, presents to the ED for persistent worsening non productive dry cough that started 3 weeks ago. Patient reports of chest discomfort 2/2 the coughing. Patient reports visiting  last week for this same cough. Results showed negative swabs and reports being discharged with Tessalon. States that the tessalon provided no relief. Endorses taking Tylenol today. No other medications taken PTA. No alleviating or exacerbating factors noted. Denies any associated symptoms.  Patient's chart and medical history reviewed.  Patient's vitals reviewed.  They are afebrile, no respiratory distress, nontoxic-appearing in the ED.  Differential diagnosis is COVID, Flu, Strep throat, Viral URI, Peritonsillar abscess, retropharyngeal abscess, Eder's angina, pneumonia, acute bronchitis, pneumothorax.   Tachycardia, 12 lead ordered for further evaluation of tachycardia, patient is continuously coughing causing difficulty in accuracy in heart rate. Manual HR on L radial artery measured twice at 92 and 80bpm separately.   With the patient's duration of symptoms of 3 weeks with a cough with production with initial symptomatic onset of improvement followed by worsening of cough with duration assuming bacterial cause for acute bronchitis. Will discharge home with a 7 day course of doxycycline.  Plan is also discussed have patient follow up with his primary care physician in the next 3-5 days for re-evaluation.  Patient agrees with this plan does not have any questions for me at this time.  Patient was currently hemodynamically stable, afebrile, tolerating p.o. intake, ambulatory without assistance. I discussed with the patient/family the diagnosis, treatment plan, indications for return to the emergency department, and for expected follow-up. The patient/family verbalized an understanding. The patient/family is asked if there are any  questions or concerns. We discuss the case, until all issues are addressed to the patient/family's satisfaction. Patient/family understands and is agreeable to the plan.   VILLA ALVAREZ    DISCLAIMER: This note was prepared with Emp[employer] voice recognition transcription software. Garbled syntax, mangled pronouns, and other bizarre constructions may be attributed to that software system.      Amount and/or Complexity of Data Reviewed  Labs: ordered. Decision-making details documented in ED Course.  Radiology: ordered and independent interpretation performed. Decision-making details documented in ED Course.  ECG/medicine tests: ordered and independent interpretation performed. Decision-making details documented in ED Course.    Risk  Prescription drug management.            Scribe Attestation:   Scribe #1: I performed the above scribed service and the documentation accurately describes the services I performed. I attest to the accuracy of the note.                         I, Villa Alvarez PA-C, personally performed the services described in this documentation. All medical record entries made by the scribe were at my direction and in my presence. I have reviewed the chart and agree that the record reflects my personal performance and is accurate and complete.              Clinical Impression:  Final diagnoses:  [R06.02] SOB (shortness of breath)  [R00.0] Tachycardia  [J40] Bronchitis (Primary)  [J20.9] Acute bronchitis, unspecified organism          ED Disposition Condition    Discharge Stable          ED Prescriptions       Medication Sig Dispense Start Date End Date Auth. Provider    dextromethorphan HBr 5 mg/5 mL Syrp Take 5 mg by mouth every 6 (six) hours. for 10 days 354 mL 12/19/2023 12/29/2023 Villa Alvarez PA-C    doxycycline (VIBRAMYCIN) 100 MG Cap Take 1 capsule (100 mg total) by mouth 2 (two) times daily. for 7 days 14 capsule 12/19/2023 12/26/2023 Villa Alvarez PA-C    albuterol  (PROVENTIL/VENTOLIN HFA) 90 mcg/actuation inhaler Inhale 1-2 puffs into the lungs every 6 (six) hours as needed. Rescue 18 g 12/19/2023 12/18/2024 Villa Kay PA-C    benzonatate (TESSALON) 100 MG capsule Take 1 capsule (100 mg total) by mouth 3 (three) times daily as needed for Cough. 20 capsule 12/19/2023 12/29/2023 Villa Kay PA-C    phenoL (CHLORASEPTIC THROAT SPRAY) 1.4 % SprA by Mucous Membrane route every 2 (two) hours. 177 mL 12/19/2023 -- Villa Kay PA-C          Follow-up Information       Follow up With Specialties Details Why Contact Info    Your primary care physician  Schedule an appointment as soon as possible for a visit in 3 days for follow up     [facility] -  Schedule an appointment as soon as possible for a visit in 3 days for follow up, As needed [address]  [place] [address]  [phone]               Villa Kay PA-C  12/19/23 0879

## 2023-12-20 NOTE — DISCHARGE INSTRUCTIONS
Thank you for coming to our Emergency Department today. It is important to remember that some problems or medical conditions are difficult to diagnose and may not be found or addressed during your Emergency Department visit.  These conditions often start with non-specific symptoms and can only be diagnosed on follow up visits with your primary care physician or specialist when the symptoms continue or change. Please remember that all medical conditions can change, and we cannot predict how you will be feeling tomorrow or the next day. Return to the ER with any questions/concerns, new/concerning symptoms, worsening or failure to improve.   Be sure to follow up with your primary care doctor and review all labs/imaging/tests that were performed during your ER visit with them. It is very common for us to identify non-emergent incidental findings which must be followed up with your primary care physician.  Some labs/imaging/tests may be outside of the normal range, and require non-emergent follow-up and/or further investigation/treatment/procedures/testing to help diagnose/exclude/prevent complications or other potentially serious medical conditions. Some abnormalities may not have been discussed or addressed during your ER visit. Some lab results may not return during your ER visit but can be accessible by downloading the free [facility] Mychart marysol or by visiting [email or web address] . It is important for you to review all labs/imaging/tests which are outside of the normal range with your physician.  An ER visit does not replace a primary care visit, and many screening tests or follow-up tests cannot be ordered by an ER doctor or performed by the ER. Some tests may even require pre-approval.  If you do not have a primary care doctor, you may contact the one listed on your discharge paperwork or you may also call the [facility] Appointment Desk at [phone] , or [address] [place] [address] at  [phone] to schedule an  appointment, or establish care with a primary care doctor or even a specialist and to obtain information about local resources. It is important to your health that you have a primary care doctor.  Please take all medications as directed. We have done our best to select a medication for you that will treat your condition however, all medications may potentially have side-effects and it is impossible to predict which medications may give you side-effects or what those side-effects (if any) those medications may give you.  If you feel that you are having a negative effect or side-effect of any medication you should stop taking those medications immediately and seek medical attention. If you feel that you are having a life-threatening reaction call 911.  Do not drive, swim, climb to height, take a bath, operate heavy machinery, drink alcohol or take potentially sedating medications, sign any legal documents or make any important decisions for 24 hours if you have received any pain medications, sedatives or mood altering drugs during your ER visit or within 24 hours of taking them if they have been prescribed to you.   You can find additional resources for Dentists, hearing aids, durable medical equipment, low cost pharmacies and other resources at [email or web address]  Patient agrees with this plan. Discussed with her strict return precautions, she verbalized understanding. Patient is stable for discharge.

## 2024-01-08 ENCOUNTER — HOSPITAL ENCOUNTER (OUTPATIENT)
Dept: RADIOLOGY | Facility: HOSPITAL | Age: 34
Discharge: HOME | End: 2024-01-08
Attending: ORTHOPAEDIC SURGERY
Payer: OTHER GOVERNMENT

## 2024-01-08 ENCOUNTER — OFFICE VISIT (OUTPATIENT)
Dept: SPORTS MEDICINE | Facility: CLINIC | Age: 34
End: 2024-01-08
Payer: OTHER GOVERNMENT

## 2024-01-08 VITALS
HEART RATE: 91 BPM | WEIGHT: 181.31 LBS | SYSTOLIC BLOOD PRESSURE: 135 MMHG | BODY MASS INDEX: 25.96 KG/M2 | DIASTOLIC BLOOD PRESSURE: 82 MMHG | HEIGHT: 70 IN

## 2024-01-08 DIAGNOSIS — M25.511 RIGHT SHOULDER PAIN, UNSPECIFIED CHRONICITY: Primary | ICD-10-CM

## 2024-01-08 DIAGNOSIS — M25.511 RIGHT SHOULDER PAIN, UNSPECIFIED CHRONICITY: ICD-10-CM

## 2024-01-08 PROCEDURE — 73030 X-RAY EXAM OF SHOULDER: CPT | Mod: TC,RT

## 2024-01-08 PROCEDURE — 99999 PR PBB SHADOW E&M-EST. PATIENT-LVL III: CPT | Mod: PBBFAC,,, | Performed by: ORTHOPAEDIC SURGERY

## 2024-01-08 PROCEDURE — 73030 X-RAY EXAM OF SHOULDER: CPT | Mod: 26,RT,, | Performed by: RADIOLOGY

## 2024-01-08 PROCEDURE — 99204 OFFICE O/P NEW MOD 45 MIN: CPT | Mod: S$PBB,,, | Performed by: ORTHOPAEDIC SURGERY

## 2024-01-08 PROCEDURE — 99213 OFFICE O/P EST LOW 20 MIN: CPT | Mod: PBBFAC | Performed by: ORTHOPAEDIC SURGERY

## 2024-01-08 RX ORDER — BUPROPION HYDROCHLORIDE 150 MG/1
TABLET ORAL
COMMUNITY
Start: 2023-11-21 | End: 2024-11-20

## 2024-01-08 RX ORDER — BUPROPION HYDROCHLORIDE 300 MG/1
TABLET ORAL
COMMUNITY
Start: 2023-10-27 | End: 2024-11-20

## 2024-01-08 NOTE — PROGRESS NOTES
CC: right Shoulder pain     33 y.o. Male reports that the pain is severe and not responding to any conservative care.      He reports that the pain is worse with overhead activity. It also bothers him at night.    He has had worsening right shoulder pain for the past 2 months. There was no specific injury but he came remember feeling a popping sensation with an overheard activity before the symptoms started. He previously injured his right AC joint when he was 19. Reportedly has had an MRI with a SLAP tear.    Works in the [employer]     SANE: 20  VAS: 5    PAST MEDICAL HISTORY: History reviewed. No pertinent past medical history.  PAST SURGICAL HISTORY:   Past Surgical History:   Procedure Laterality Date    COLONOSCOPY N/A 5/25/2023    Procedure: COLONOSCOPY;  Surgeon: Ronda Feliciano MD;  Location: [facility];  Service: Colon and Rectal;  Laterality: N/A;     FAMILY HISTORY: No family history on file.  SOCIAL HISTORY:   Social History     Socioeconomic History    Marital status:    Tobacco Use    Smoking status: Never    Smokeless tobacco: Never   Substance and Sexual Activity    Alcohol use: Not Currently    Drug use: Never    Sexual activity: Yes     Partners: Female       MEDICATIONS:   Current Outpatient Medications:     albuterol (PROVENTIL/VENTOLIN HFA) 90 mcg/actuation inhaler, Inhale 1-2 puffs into the lungs every 6 (six) hours as needed. Rescue, Disp: 18 g, Rfl: 0    buPROPion (WELLBUTRIN XL) 150 MG TB24 tablet, Do not drink alcohol.Take or use exactly as directed.Obtain advice for OTCs.May impair driving.Swallow whole.Check with your doctor before becoming pregnant., Disp: , Rfl:     buPROPion (WELLBUTRIN XL) 300 MG 24 hr tablet, Do not drink alcohol.Take or use exactly as directed.Obtain advice for OTCs.May impair driving.Swallow whole.Check with your doctor before becoming pregnant., Disp: , Rfl:     ibuprofen (ADVIL,MOTRIN) 600 MG tablet, Take 1 tablet (600 mg total) by mouth every 6  "(six) hours as needed for Pain., Disp: 20 tablet, Rfl: 0    diltiazem HCl (DILTIAZEM 2% CREAM), Apply topically 3 (three) times daily. Apply topically to anal area. (Patient not taking: Reported on 1/8/2024), Disp: 30 g, Rfl: 3    meloxicam (MOBIC) 15 MG tablet, Take 15 mg by mouth daily as needed., Disp: , Rfl:     meloxicam (MOBIC) 7.5 MG tablet, Take 1 tablet (7.5 mg total) by mouth once daily. (Patient not taking: Reported on 1/8/2024), Disp: 12 tablet, Rfl: 0    phenoL (CHLORASEPTIC THROAT SPRAY) 1.4 % SprA, by Mucous Membrane route every 2 (two) hours. (Patient not taking: Reported on 1/8/2024), Disp: 177 mL, Rfl: 0  ALLERGIES: Review of patient's allergies indicates:  No Known Allergies    VITAL SIGNS: /82   Pulse 91   Ht 5' 10" (1.778 m)   Wt 82.2 kg (181 lb 5.3 oz)   BMI 26.02 kg/m²      Review of Systems   Constitution: Negative. Negative for chills, fever and night sweats.   HENT: Negative for congestion and headaches.    Eyes: Negative for blurred vision, left vision loss and right vision loss.   Cardiovascular: Negative for chest pain and syncope.   Respiratory: Negative for cough and shortness of breath.    Endocrine: Negative for polydipsia, polyphagia and polyuria.   Hematologic/Lymphatic: Negative for bleeding problem. Does not bruise/bleed easily.   Skin: Negative for dry skin, itching and rash.   Musculoskeletal: Negative for falls and muscle weakness.   Gastrointestinal: Negative for abdominal pain and bowel incontinence.   Genitourinary: Negative for bladder incontinence and nocturia.   Neurological: Negative for disturbances in coordination, loss of balance and seizures.   Psychiatric/Behavioral: Negative for depression. The patient does not have insomnia.    Allergic/Immunologic: Negative for hives and persistent infections.       PHYSICAL EXAMINATION:  General:  The patient is alert and oriented x 3.  Mood is pleasant.  Observation of ears, eyes and nose reveal no gross " abnormalities.  HEENT: NCAT, sclera nonicteric  Lungs: Respirations are equal and unlabored.  Gait is coordinated. Patient can toe walk and heel walk without difficulty.  Cardiovascular: There are no swelling or varicosities present.   Lymphatic: Negative for adenopathy       right SHOULDER / UPPER EXTREMITY EXAM    OBSERVATION:     Swelling  none  Deformity  none   Discoloration  none   Scapular winging none   Scars   none  Atrophy  none    TENDERNESS / CREPITUS (T/C):          T/C      T/C   Clavicle   -/-  SUPRAspinatus    -/-   AC Jt.    -/+  INFRAspinatus  -/-   SC Jt.    -/-  Deltoid    -/-   G. Tuberosity  -/-  LH BICEP groove  +/-   Acromion:  -/-  Midline Neck   -/-   Scapular Spine -/-  Trapezium   -/-   SMA Scapula  -/-  GH jt. line - post  -/-   Scapulothoracic  -/-         ROM: (* = with pain)  Right shoulder   Left shoulder        AROM (PROM)   AROM (PROM)   FE    90° (175°)     170° (175°)     ER at 0°    30°  (65°)    60°  (65°)   ER at 90° ABD  90°  (90°)    90°  (90°)   IR at 90°  ABD   0  (40°)     NA  (40°)      IR (spine level)   L4    T10    STRENGTH: (* = with pain) RIGHT SHOULDER  LEFT SHOULDER   SCAPTION   3/5    5/5    IR    4/5    5/5   ER    4/5    5/5   BICEPS   4/5    5/5   Deltoid    5/5    5/5     SIGNS:  Painful side       NEER   neg    OJAGRUTIS  +   DIAL   +    SPEEDS  +   DROP ARM   neg   BELLY PRESS Neg   Superior escape none    LIFT-OFF  Neg   X-Body ADD    neg    MOVING VALGUS Neg      STABILITY TESTING    RIGHT SHOULDER   LEFT SHOULDER       Translation    Anterior  up face     up face    Posterior  up face    up face    Sulcus   < 10mm    < 10 mm    Signs    Apprehension   neg      Neg    Relocation   no change     no change    Jerk test  neg     Neg      EXTREMITY NEURO-VASCULAR EXAM    Sensation grossly intact to light touch all dermatomal regions.    DTR 2+ Biceps, Triceps, BR and Negative Josephs sign   Grossly intact motor function at Elbow, Wrist and  Hand   Distal pulses radial and ulnar 2+, brisk cap refill, symmetric.      NECK:  Painless FROM and spinous processes non-tender. Negative Spurlings sign.      OTHER FINDINGS:    XRAYS reviewed and interpreted personally by me:::  Shoulder trauma series right,  were ordered and reviewed by me. No convincing fracture or dislocation is noted. The osseous structures appear well mineralized and well aligned    1. Shoulder instability, right, SLAP tear      Plan:       ASSESSMENT:  shoulder instabilty, SLAP tear   He will bring us a copy of the MRI scan  We will review this and call him with results   We discussed a biceps tenodesis, SAD and possible SLAP repair today pending MRI results review     I will see him back upon its completion or PHREV and we will consider the above.

## 2024-01-12 ENCOUNTER — OFFICE VISIT (OUTPATIENT)
Dept: SPORTS MEDICINE | Facility: CLINIC | Age: 34
End: 2024-01-12
Payer: OTHER GOVERNMENT

## 2024-01-12 ENCOUNTER — TELEPHONE (OUTPATIENT)
Dept: SPORTS MEDICINE | Facility: CLINIC | Age: 34
End: 2024-01-12
Payer: OTHER GOVERNMENT

## 2024-01-12 VITALS
HEIGHT: 70 IN | DIASTOLIC BLOOD PRESSURE: 90 MMHG | WEIGHT: 178.56 LBS | HEART RATE: 92 BPM | BODY MASS INDEX: 25.56 KG/M2 | SYSTOLIC BLOOD PRESSURE: 133 MMHG

## 2024-01-12 DIAGNOSIS — S43.431A LABRAL TEAR OF SHOULDER, RIGHT, INITIAL ENCOUNTER: Primary | ICD-10-CM

## 2024-01-12 DIAGNOSIS — M19.011 ARTHRITIS OF RIGHT ACROMIOCLAVICULAR JOINT: ICD-10-CM

## 2024-01-12 DIAGNOSIS — G89.29 CHRONIC RIGHT SHOULDER PAIN: Primary | ICD-10-CM

## 2024-01-12 DIAGNOSIS — M75.21 BICEPS TENDONITIS ON RIGHT: ICD-10-CM

## 2024-01-12 DIAGNOSIS — M75.101 NON-TRAUMATIC ROTATOR CUFF TEAR, RIGHT: ICD-10-CM

## 2024-01-12 DIAGNOSIS — M25.511 CHRONIC RIGHT SHOULDER PAIN: Primary | ICD-10-CM

## 2024-01-12 DIAGNOSIS — S43.431A SUPERIOR GLENOID LABRUM LESION OF RIGHT SHOULDER, INITIAL ENCOUNTER: ICD-10-CM

## 2024-01-12 DIAGNOSIS — S43.431A SLAP LESION OF RIGHT SHOULDER: ICD-10-CM

## 2024-01-12 PROCEDURE — 99999 PR PBB SHADOW E&M-EST. PATIENT-LVL IV: CPT | Mod: PBBFAC,,, | Performed by: PHYSICIAN ASSISTANT

## 2024-01-12 PROCEDURE — 99499 UNLISTED E&M SERVICE: CPT | Mod: S$PBB,,, | Performed by: PHYSICIAN ASSISTANT

## 2024-01-12 PROCEDURE — 99214 OFFICE O/P EST MOD 30 MIN: CPT | Mod: PBBFAC | Performed by: PHYSICIAN ASSISTANT

## 2024-01-12 RX ORDER — TRAMADOL HYDROCHLORIDE 50 MG/1
50-100 TABLET ORAL EVERY 6 HOURS PRN
Qty: 21 TABLET | Refills: 0 | Status: SHIPPED | OUTPATIENT
Start: 2024-01-12

## 2024-01-12 RX ORDER — PROMETHAZINE HYDROCHLORIDE 25 MG/1
25 TABLET ORAL EVERY 6 HOURS PRN
Qty: 8 TABLET | Refills: 0 | Status: SHIPPED | OUTPATIENT
Start: 2024-01-12

## 2024-01-12 RX ORDER — ASPIRIN 81 MG/1
81 TABLET ORAL DAILY
Qty: 28 TABLET | Refills: 0 | Status: SHIPPED | OUTPATIENT
Start: 2024-01-12 | End: 2025-01-11

## 2024-01-12 RX ORDER — OXYCODONE AND ACETAMINOPHEN 10; 325 MG/1; MG/1
TABLET ORAL
Qty: 21 TABLET | Refills: 0 | Status: SHIPPED | OUTPATIENT
Start: 2024-01-12

## 2024-01-12 NOTE — TELEPHONE ENCOUNTER
MRI Right shoulder reviewed and interpreted personally by Dr. Villalba (EXTERNAL:UPLOADED) results in media: Posterior superior glenoid labral tear, mild bursal rotator cuff fraying, biceps tendinitis, AC moderate hypertrophy, degenerative changes    ASSESSMENT:    Right Shoulder SLAP, biceps tendonitis, Rotator Cuff fraying.      he would benefit from an arthroscopy, given the above.       PLAN:   Right Shoulder SLAP     All questions were answered, pt will contact us for questions or concerns in the interim.  Had thorough discussion of non-operative vs operative intervention, and risks and benefits of both.     We have discussed the surgery and recovery of arthroscopic shoulder surgery. he understands that there may be limited mobility up to several weeks after surgery depending on procedures that are performed at the time of surgery.    The spectrum of treatment options were discussed with the patient, including nonoperative and operative options.  After thorough discussion, the patient has elected to undergo surgical treatment to include:    right   a. Shoulder arthroscopic loop n radha biceps tenodesis   b. Shoulder arthroscopic possible SLAP repair   c. Shoulder arthroscopic possible rotator cuff debridement with possible Cuffmend   d. Shoulder arthroscopic SAD   e. Shoulder arthroscopic extensive debridement   f.  Shoulder arthroscopic DCE       The details of the surgical procedure were explained, including the location of probable incisions and a description of likely hardware and/or grafts to be used.  The patient understands the likely convalescence after surgery.  Also, we have thoroughly discussed the risks, benefits and alternatives to surgery, including, but not limited to, the risk of infection, joint stiffness, blood clot (including DVT and/or pulmonary embolus), neurologic and vascular injury.  It was explained that, if tissue has been repaired or reconstructed, there is a chance of failure, which may  require further management.  Consent form for surgery is signed and in chart.

## 2024-01-16 RX ORDER — PREGABALIN 75 MG/1
75 CAPSULE ORAL
Status: CANCELLED | OUTPATIENT
Start: 2024-01-16 | End: 2024-01-16

## 2024-01-16 RX ORDER — CLINDAMYCIN HYDROCHLORIDE 150 MG/1
450 CAPSULE ORAL
Status: CANCELLED | OUTPATIENT
Start: 2024-01-16 | End: 2024-01-16

## 2024-01-16 RX ORDER — FAMOTIDINE 10 MG/ML
20 INJECTION INTRAVENOUS
Status: CANCELLED | OUTPATIENT
Start: 2024-01-16 | End: 2024-01-16

## 2024-01-16 RX ORDER — OXYCODONE HCL 10 MG/1
10 TABLET, FILM COATED, EXTENDED RELEASE ORAL
Status: CANCELLED | OUTPATIENT
Start: 2024-01-16 | End: 2024-01-16

## 2024-01-16 RX ORDER — SODIUM CHLORIDE 9 MG/ML
INJECTION, SOLUTION INTRAVENOUS CONTINUOUS
Status: CANCELLED | OUTPATIENT
Start: 2024-01-16

## 2024-01-16 RX ORDER — CEFAZOLIN SODIUM 2 G/50ML
2 SOLUTION INTRAVENOUS
Status: CANCELLED | OUTPATIENT
Start: 2024-01-16

## 2024-01-16 NOTE — H&P
Jasson Velasco  is here for a completion of his perioperative paperwork. he  Is scheduled to undergo     right              a. Shoulder arthroscopic loop n radha biceps tenodesis              b. Shoulder arthroscopic possible SLAP repair              c. Shoulder arthroscopic possible rotator cuff debridement with possible Cuffmend              d. Shoulder arthroscopic SAD              e. Shoulder arthroscopic extensive debridement              f.  Shoulder arthroscopic DCE on 1/18/24.      He is a healthy individual and does not need clearance for this procedure.     PAST MEDICAL HISTORY: History reviewed. No pertinent past medical history.  PAST SURGICAL HISTORY:   Past Surgical History:   Procedure Laterality Date    COLONOSCOPY N/A 5/25/2023    Procedure: COLONOSCOPY;  Surgeon: Ronda Feliciano MD;  Location: [facility];  Service: Colon and Rectal;  Laterality: N/A;     FAMILY HISTORY: History reviewed. No pertinent family history.  SOCIAL HISTORY:   Social History     Socioeconomic History    Marital status:    Tobacco Use    Smoking status: Never    Smokeless tobacco: Never   Substance and Sexual Activity    Alcohol use: Not Currently    Drug use: Never    Sexual activity: Yes     Partners: Female       MEDICATIONS:   Current Outpatient Medications:     albuterol (PROVENTIL/VENTOLIN HFA) 90 mcg/actuation inhaler, Inhale 1-2 puffs into the lungs every 6 (six) hours as needed. Rescue, Disp: 18 g, Rfl: 0    buPROPion (WELLBUTRIN XL) 150 MG TB24 tablet, Do not drink alcohol.Take or use exactly as directed.Obtain advice for OTCs.May impair driving.Swallow whole.Check with your doctor before becoming pregnant., Disp: , Rfl:     buPROPion (WELLBUTRIN XL) 300 MG 24 hr tablet, Do not drink alcohol.Take or use exactly as directed.Obtain advice for OTCs.May impair driving.Swallow whole.Check with your doctor before becoming pregnant., Disp: , Rfl:     ibuprofen (ADVIL,MOTRIN) 600 MG tablet, Take 1 tablet (600 mg total) by  "mouth every 6 (six) hours as needed for Pain., Disp: 20 tablet, Rfl: 0    meloxicam (MOBIC) 15 MG tablet, Take 15 mg by mouth daily as needed., Disp: , Rfl:     aspirin (ECOTRIN) 81 MG EC tablet, Take 1 tablet (81 mg total) by mouth once daily. For 4 weeks starting the day after surgery., Disp: 28 tablet, Rfl: 0    diltiazem HCl (DILTIAZEM 2% CREAM), Apply topically 3 (three) times daily. Apply topically to anal area. (Patient not taking: Reported on 1/8/2024), Disp: 30 g, Rfl: 3    meloxicam (MOBIC) 7.5 MG tablet, Take 1 tablet (7.5 mg total) by mouth once daily. (Patient not taking: Reported on 1/8/2024), Disp: 12 tablet, Rfl: 0    oxyCODONE-acetaminophen (PERCOCET)  mg per tablet, Take 1 tablet by mouth every 4-6 hours as needed for pain. Take stool softener with this medication., Disp: 21 tablet, Rfl: 0    phenoL (CHLORASEPTIC THROAT SPRAY) 1.4 % SprA, by Mucous Membrane route every 2 (two) hours. (Patient not taking: Reported on 1/8/2024), Disp: 177 mL, Rfl: 0    promethazine (PHENERGAN) 25 MG tablet, Take 1 tablet (25 mg total) by mouth every 6 (six) hours as needed for Nausea., Disp: 8 tablet, Rfl: 0    traMADoL (ULTRAM) 50 mg tablet, Take 1-2 tablets ( mg total) by mouth every 6 (six) hours as needed for Pain., Disp: 21 tablet, Rfl: 0  ALLERGIES: Review of patient's allergies indicates:  No Known Allergies    VITAL SIGNS: BP (!) 133/90   Pulse 92   Ht 5' 10" (1.778 m)   Wt 81 kg (178 lb 9.2 oz)   BMI 25.62 kg/m²      Risks, indications and benefits of the surgical procedure were discussed with the patient. All questions with regard to surgery, rehab, expected return to functional activities, activities of daily living and recreational endeavors were answered to his satisfaction.    It was explained to the patient that there may be an increase in surgical risks if the patient has certain co-morbidities such as but not limited to: Obesity, Cardiovascular issues (CHF, CAD, Arrhythmias), chronic " pulmonary issues, previous or current neurovascular/neurological issues, previous strokes, diabetes mellitus, previous wound healing issues, previous wound or skin infections, PVD, clotting disorders, if the patient uses chronic steroids, if the patient takes or has immune compromising medications or diseases, or has previously or currently used tobacco products.     The patient verbalized that he/she does not have any additional clotting, bleeding, or blood disorders, other than what is list in her chart on today's review.     Then a brief history and physical exam were performed.    Review of Systems   Constitution: Negative. Negative for chills, fever and night sweats.   HENT: Negative for congestion and headaches.    Eyes: Negative for blurred vision, left vision loss and right vision loss.   Cardiovascular: Negative for chest pain and syncope.   Respiratory: Negative for cough and shortness of breath.    Endocrine: Negative for polydipsia, polyphagia and polyuria.   Hematologic/Lymphatic: Negative for bleeding problem. Does not bruise/bleed easily.   Skin: Negative for dry skin, itching and rash.   Musculoskeletal: Negative for falls and muscle weakness.   Gastrointestinal: Negative for abdominal pain and bowel incontinence.   Genitourinary: Negative for bladder incontinence and nocturia.   Neurological: Negative for disturbances in coordination, loss of balance and seizures.   Psychiatric/Behavioral: Negative for depression. The patient does not have insomnia.    Allergic/Immunologic: Negative for hives and persistent infections.     PHYSICAL EXAM:  GEN: A&Ox3, WD WN NAD  HEENT: WNL  CHEST: CTAB, no W/R/R  HEART: RRR, no M/R/G  ABD: Soft, NT ND, BS x4 QUADS  MS; See Epic  NEURO: CN II-XII intact       The surgical consent was then reviewed with the patient, who agreed with all the contents of the consent form and it was signed. he was then given the surgery packet to bring with him to surgery center for the  anesthesia portion of his perioperative paperwork (if needed)   For all physicians except for Dr. Hobson, we will email and possibly fax the consent forms and booking sheets to [facility].    The patient was given the opportunity to ask questions about the surgical plan and consent form, and once no other questions were asked, I proceeded with the pre-op appointment.    PHYSICAL THERAPY:  He was also instructed regarding physical therapy and will begin  on  1 week post-op. He was given a copy of the original prescription to schedule. Another copy of this prescription was also faxed to [facility].    POST OP CARE:instructions were reviewed including care of the wound and dressing after surgery and when he can shower. Patient was told not sleep or lay on there surgical extremity following surgery as this could cause repair damage, tissue damage, or nerve injury.    An extensive amount of time was spent on discussion of the following information based on what type of surgery the patient was having. Patient expressed understanding of the material below:    Shoulder surgery or upper extremity surgery requiring post-op sling:  It was explained to the patient that they should remove their arm from the sling approximately 6 times per day to do full elbow ROM (flexion and extension) and full supination and pronation of the elbow for approximately 5 minutes at a time to help prevent elbow stiffness, nerve pain or problems, or nerve injury. They were told to contact us if they begin having numbness and tingling of there surgical extremity that persists longer then 1 day without relief.     Extremity surgery requiring a splint:   It was explained to patient on how to properly elevated position there extremity to prevent pressure ulcers from occurring. I made sure that the patient understood that that surgical site may be numb following surgery and prevent them from feeling pressure pain that they would normal  feel if a pressure injury was occurring. Pressure ulcers and there causes were discussed with the patient today.     Post-operative splint:  It was explain to the patient that they can contact us at anytime if they feel that there is a problem with their splint or under their splint that needs evaluation. If there is concern, questions, or discomfort with the splint then they can present to either our clinic or the [facility] ED for removal, evaluation, and replacement of the splint.    CRUTCHES OR WALKER: It was explained to the patient that if they are having a lower extremity surgery that they will require either a walker or crutches to ambulate safely with after surgery. It was explained that a cane or other assistive devices are not sufficient to safely ambulate with after surgery. I explained to the patient that I will place an order for them to receive either crutches or a walker after surgery to go home with. It was explained that if they have crutches or a walker at home already, that they are REQUIRED to bring them to the hospital on the day of surgery. It was explained that if they do not have them at the hospital on the day of surgery that they WILL be provided a new pair or crutches or a walker to go home with to ensure ambulation will be safe if the patient needs to stop somewhere on the way home.      PAIN MANAGEMENT: Jasson Velasco was also given a pain management regime, which includes the option of getting a TENS unit given to him by [facility] (if patient chooses) along with the education required for its use. He was also instructed regarding the Polar ice unit or gel ice packs (chosen by patient) that will be in place after surgery and his postoperative pain medications.     Patient understands that Polar Ice machine has to be bought today if they want it. It cannot be bought on day of surgery at surgery center.     PAIN MEDICATION:  Percocet 10/325mg 1 po q 4-6 hours prn pain  Ultram 50 mg  Take 1-2 p.o. q.6 hours p.r.n. breakthrough pain,   Phenergan 25 mg one p.o. q.6 hours p.r.n. nausea and vomiting.    DVT prophylaxis was discussed with the patient today including risk factors for developing DVTs and history of DVTs. The patient was asked if any specific recommendations were given from the doctor/s that did pre-operative surgical clearance. The patient was then given an education sheet about DVTs and PE with warning signs and symptoms of both and steps to take if they suspect either of these.    This along with the Modified Caprini risk assessment model for VTE in general surgical patients was used to determine the patient's DVT risk.     From: Nadja MK, Sinan DA, Glory SM, et al. Prevention of VTE in nonorthopedic surgical patients: antithrombotic therapy and prevention of thrombosis, 9th ed: American College [employer] evidence-based clinical practical guidelines. Chest 2012; 141:e227S. Copyright © 2012. Reproduced with permission from the American College of Chest [employer]icians.    The below listed DVT prophylaxis regimen along with bilateral JENNIFER compression stockings will be used post-op. Length of treatment has been determined to be 10-42 days post-op by the above noted Caprini assessment model.     The patient was instructed to buy and take:  Aspirin 81mg QD x 4 weeks for DVT prophylaxis starting on the morning after surgery.  Patient will also use bilateral TEDs on lower extremities, SCDs during surgery, and early ambulation post-op. If the patient was previously taking 81mg baby aspirin, they were told to not take it starting 5 days prior to surgery and to restart the 81mg aspirin after surgery.       Patient was also told to buy over the counter Prilosec medication if needed and take it once daily for GI protection as long as they are taking NSAIDs or Aspirin.   Patient denies history of seizures.       The patient was told that narcotic pain medications may make them drowsy and  instructions were given to not sign legal documents, drive or operate heavy machinery, cars, or equipment while under the influence of narcotic medications. The patient was told and understands that narcotic pain medications should only be used as needed to control pain and that other options of pain control include TENs unit and ice packs/unit.     As there were no other questions to be asked, he was given my business card along with Samia Villalba MD business card if he has any questions or concerns prior to surgery or in the postop period.

## 2024-01-16 NOTE — H&P (VIEW-ONLY)
Jasson Velasco  is here for a completion of his perioperative paperwork. he  Is scheduled to undergo     right              a. Shoulder arthroscopic loop n radha biceps tenodesis              b. Shoulder arthroscopic possible SLAP repair              c. Shoulder arthroscopic possible rotator cuff debridement with possible Cuffmend              d. Shoulder arthroscopic SAD              e. Shoulder arthroscopic extensive debridement              f.  Shoulder arthroscopic DCE on 1/18/24.      He is a healthy individual and does not need clearance for this procedure.     PAST MEDICAL HISTORY: History reviewed. No pertinent past medical history.  PAST SURGICAL HISTORY:   Past Surgical History:   Procedure Laterality Date    COLONOSCOPY N/A 5/25/2023    Procedure: COLONOSCOPY;  Surgeon: Ronda Feliciano MD;  Location: [facility];  Service: Colon and Rectal;  Laterality: N/A;     FAMILY HISTORY: History reviewed. No pertinent family history.  SOCIAL HISTORY:   Social History     Socioeconomic History    Marital status:    Tobacco Use    Smoking status: Never    Smokeless tobacco: Never   Substance and Sexual Activity    Alcohol use: Not Currently    Drug use: Never    Sexual activity: Yes     Partners: Female       MEDICATIONS:   Current Outpatient Medications:     albuterol (PROVENTIL/VENTOLIN HFA) 90 mcg/actuation inhaler, Inhale 1-2 puffs into the lungs every 6 (six) hours as needed. Rescue, Disp: 18 g, Rfl: 0    buPROPion (WELLBUTRIN XL) 150 MG TB24 tablet, Do not drink alcohol.Take or use exactly as directed.Obtain advice for OTCs.May impair driving.Swallow whole.Check with your doctor before becoming pregnant., Disp: , Rfl:     buPROPion (WELLBUTRIN XL) 300 MG 24 hr tablet, Do not drink alcohol.Take or use exactly as directed.Obtain advice for OTCs.May impair driving.Swallow whole.Check with your doctor before becoming pregnant., Disp: , Rfl:     ibuprofen (ADVIL,MOTRIN) 600 MG tablet, Take 1 tablet (600 mg total) by  "mouth every 6 (six) hours as needed for Pain., Disp: 20 tablet, Rfl: 0    meloxicam (MOBIC) 15 MG tablet, Take 15 mg by mouth daily as needed., Disp: , Rfl:     aspirin (ECOTRIN) 81 MG EC tablet, Take 1 tablet (81 mg total) by mouth once daily. For 4 weeks starting the day after surgery., Disp: 28 tablet, Rfl: 0    diltiazem HCl (DILTIAZEM 2% CREAM), Apply topically 3 (three) times daily. Apply topically to anal area. (Patient not taking: Reported on 1/8/2024), Disp: 30 g, Rfl: 3    meloxicam (MOBIC) 7.5 MG tablet, Take 1 tablet (7.5 mg total) by mouth once daily. (Patient not taking: Reported on 1/8/2024), Disp: 12 tablet, Rfl: 0    oxyCODONE-acetaminophen (PERCOCET)  mg per tablet, Take 1 tablet by mouth every 4-6 hours as needed for pain. Take stool softener with this medication., Disp: 21 tablet, Rfl: 0    phenoL (CHLORASEPTIC THROAT SPRAY) 1.4 % SprA, by Mucous Membrane route every 2 (two) hours. (Patient not taking: Reported on 1/8/2024), Disp: 177 mL, Rfl: 0    promethazine (PHENERGAN) 25 MG tablet, Take 1 tablet (25 mg total) by mouth every 6 (six) hours as needed for Nausea., Disp: 8 tablet, Rfl: 0    traMADoL (ULTRAM) 50 mg tablet, Take 1-2 tablets ( mg total) by mouth every 6 (six) hours as needed for Pain., Disp: 21 tablet, Rfl: 0  ALLERGIES: Review of patient's allergies indicates:  No Known Allergies    VITAL SIGNS: BP (!) 133/90   Pulse 92   Ht 5' 10" (1.778 m)   Wt 81 kg (178 lb 9.2 oz)   BMI 25.62 kg/m²      Risks, indications and benefits of the surgical procedure were discussed with the patient. All questions with regard to surgery, rehab, expected return to functional activities, activities of daily living and recreational endeavors were answered to his satisfaction.    It was explained to the patient that there may be an increase in surgical risks if the patient has certain co-morbidities such as but not limited to: Obesity, Cardiovascular issues (CHF, CAD, Arrhythmias), chronic " pulmonary issues, previous or current neurovascular/neurological issues, previous strokes, diabetes mellitus, previous wound healing issues, previous wound or skin infections, PVD, clotting disorders, if the patient uses chronic steroids, if the patient takes or has immune compromising medications or diseases, or has previously or currently used tobacco products.     The patient verbalized that he/she does not have any additional clotting, bleeding, or blood disorders, other than what is list in her chart on today's review.     Then a brief history and physical exam were performed.    Review of Systems   Constitution: Negative. Negative for chills, fever and night sweats.   HENT: Negative for congestion and headaches.    Eyes: Negative for blurred vision, left vision loss and right vision loss.   Cardiovascular: Negative for chest pain and syncope.   Respiratory: Negative for cough and shortness of breath.    Endocrine: Negative for polydipsia, polyphagia and polyuria.   Hematologic/Lymphatic: Negative for bleeding problem. Does not bruise/bleed easily.   Skin: Negative for dry skin, itching and rash.   Musculoskeletal: Negative for falls and muscle weakness.   Gastrointestinal: Negative for abdominal pain and bowel incontinence.   Genitourinary: Negative for bladder incontinence and nocturia.   Neurological: Negative for disturbances in coordination, loss of balance and seizures.   Psychiatric/Behavioral: Negative for depression. The patient does not have insomnia.    Allergic/Immunologic: Negative for hives and persistent infections.     PHYSICAL EXAM:  GEN: A&Ox3, WD WN NAD  HEENT: WNL  CHEST: CTAB, no W/R/R  HEART: RRR, no M/R/G  ABD: Soft, NT ND, BS x4 QUADS  MS; See Epic  NEURO: CN II-XII intact       The surgical consent was then reviewed with the patient, who agreed with all the contents of the consent form and it was signed. he was then given the surgery packet to bring with him to surgery center for the  anesthesia portion of his perioperative paperwork (if needed)   For all physicians except for Dr. Hobson, we will email and possibly fax the consent forms and booking sheets to [facility].    The patient was given the opportunity to ask questions about the surgical plan and consent form, and once no other questions were asked, I proceeded with the pre-op appointment.    PHYSICAL THERAPY:  He was also instructed regarding physical therapy and will begin  on  1 week post-op. He was given a copy of the original prescription to schedule. Another copy of this prescription was also faxed to [facility].    POST OP CARE:instructions were reviewed including care of the wound and dressing after surgery and when he can shower. Patient was told not sleep or lay on there surgical extremity following surgery as this could cause repair damage, tissue damage, or nerve injury.    An extensive amount of time was spent on discussion of the following information based on what type of surgery the patient was having. Patient expressed understanding of the material below:    Shoulder surgery or upper extremity surgery requiring post-op sling:  It was explained to the patient that they should remove their arm from the sling approximately 6 times per day to do full elbow ROM (flexion and extension) and full supination and pronation of the elbow for approximately 5 minutes at a time to help prevent elbow stiffness, nerve pain or problems, or nerve injury. They were told to contact us if they begin having numbness and tingling of there surgical extremity that persists longer then 1 day without relief.     Extremity surgery requiring a splint:   It was explained to patient on how to properly elevated position there extremity to prevent pressure ulcers from occurring. I made sure that the patient understood that that surgical site may be numb following surgery and prevent them from feeling pressure pain that they would normal  feel if a pressure injury was occurring. Pressure ulcers and there causes were discussed with the patient today.     Post-operative splint:  It was explain to the patient that they can contact us at anytime if they feel that there is a problem with their splint or under their splint that needs evaluation. If there is concern, questions, or discomfort with the splint then they can present to either our clinic or the [facility] ED for removal, evaluation, and replacement of the splint.    CRUTCHES OR WALKER: It was explained to the patient that if they are having a lower extremity surgery that they will require either a walker or crutches to ambulate safely with after surgery. It was explained that a cane or other assistive devices are not sufficient to safely ambulate with after surgery. I explained to the patient that I will place an order for them to receive either crutches or a walker after surgery to go home with. It was explained that if they have crutches or a walker at home already, that they are REQUIRED to bring them to the hospital on the day of surgery. It was explained that if they do not have them at the hospital on the day of surgery that they WILL be provided a new pair or crutches or a walker to go home with to ensure ambulation will be safe if the patient needs to stop somewhere on the way home.      PAIN MANAGEMENT: Jasson Velasco was also given a pain management regime, which includes the option of getting a TENS unit given to him by [facility] (if patient chooses) along with the education required for its use. He was also instructed regarding the Polar ice unit or gel ice packs (chosen by patient) that will be in place after surgery and his postoperative pain medications.     Patient understands that Polar Ice machine has to be bought today if they want it. It cannot be bought on day of surgery at surgery center.     PAIN MEDICATION:  Percocet 10/325mg 1 po q 4-6 hours prn pain  Ultram 50 mg  Take 1-2 p.o. q.6 hours p.r.n. breakthrough pain,   Phenergan 25 mg one p.o. q.6 hours p.r.n. nausea and vomiting.    DVT prophylaxis was discussed with the patient today including risk factors for developing DVTs and history of DVTs. The patient was asked if any specific recommendations were given from the doctor/s that did pre-operative surgical clearance. The patient was then given an education sheet about DVTs and PE with warning signs and symptoms of both and steps to take if they suspect either of these.    This along with the Modified Caprini risk assessment model for VTE in general surgical patients was used to determine the patient's DVT risk.     From: Nadja MK, Sinan DA, Glory SM, et al. Prevention of VTE in nonorthopedic surgical patients: antithrombotic therapy and prevention of thrombosis, 9th ed: American College of Chest Physicians evidence-based clinical practical guidelines. Chest 2012; 141:e227S. Copyright © 2012. Reproduced with permission from the American College of Chest Physicians.    The below listed DVT prophylaxis regimen along with bilateral JENNIFER compression stockings will be used post-op. Length of treatment has been determined to be 10-42 days post-op by the above noted Caprini assessment model.     The patient was instructed to buy and take:  Aspirin 81mg QD x 4 weeks for DVT prophylaxis starting on the morning after surgery.  Patient will also use bilateral TEDs on lower extremities, SCDs during surgery, and early ambulation post-op. If the patient was previously taking 81mg baby aspirin, they were told to not take it starting 5 days prior to surgery and to restart the 81mg aspirin after surgery.       Patient was also told to buy over the counter Prilosec medication if needed and take it once daily for GI protection as long as they are taking NSAIDs or Aspirin.   Patient denies history of seizures.       The patient was told that narcotic pain medications may make them drowsy and  instructions were given to not sign legal documents, drive or operate heavy machinery, cars, or equipment while under the influence of narcotic medications. The patient was told and understands that narcotic pain medications should only be used as needed to control pain and that other options of pain control include TENs unit and ice packs/unit.     As there were no other questions to be asked, he was given my business card along with Samia Villalba MD business card if he has any questions or concerns prior to surgery or in the postop period.

## 2024-01-17 ENCOUNTER — TELEPHONE (OUTPATIENT)
Dept: SPORTS MEDICINE | Facility: CLINIC | Age: 34
End: 2024-01-17
Payer: OTHER GOVERNMENT

## 2024-01-17 ENCOUNTER — ANESTHESIA EVENT (OUTPATIENT)
Dept: SURGERY | Facility: HOSPITAL | Age: 34
End: 2024-01-17
Payer: OTHER GOVERNMENT

## 2024-01-17 RX ORDER — CETIRIZINE HYDROCHLORIDE 10 MG/1
10 TABLET ORAL DAILY PRN
COMMUNITY
Start: 2023-12-07

## 2024-01-17 RX ORDER — BENZONATATE 100 MG/1
100 CAPSULE ORAL 3 TIMES DAILY PRN
COMMUNITY
Start: 2023-12-19

## 2024-01-17 RX ORDER — VENLAFAXINE HYDROCHLORIDE 37.5 MG/1
37.5 CAPSULE, EXTENDED RELEASE ORAL DAILY
COMMUNITY
Start: 2023-02-22 | End: 2024-02-16

## 2024-01-17 RX ORDER — FLUTICASONE PROPIONATE 50 MCG
2 SPRAY, SUSPENSION (ML) NASAL DAILY PRN
COMMUNITY
Start: 2023-12-07

## 2024-01-17 NOTE — TELEPHONE ENCOUNTER
Spoke with pt, pt was notified of 1/18/24 surgery arrival time of 8am, also informed NPO 8hrs prior to arrival time

## 2024-01-17 NOTE — ANESTHESIA PAT ROS NOTE
01/17/2024  Jasson Velasco is a 33 y.o., male.      Pre-op Assessment    I have reviewed the Patient Summary Reports.       I have reviewed the Medications.     Review of Systems  Anesthesia Hx:  No problems with previous Anesthesia   History of prior surgery of interest to airway management or planning:  Previous anesthesia: MAC       5/25/2023  Colonoscopy with MAC.  Procedure performed at an [facility].    Denies Personal Hx of Anesthesia complications.                    Social:  Non-Smoker, No Alcohol Use       Hematology/Oncology:  Hematology Normal   Oncology Normal                                   EENT/Dental:  EENT/Dental Normal           Cardiovascular:  Exercise tolerance: good        Denies Dysrhythmias.         Denies ANGEL.  ECG has been reviewed.                          Pulmonary:     Denies Asthma.   Denies Shortness of breath.   H/O Bronchitis 1 month ago               Renal/:  Renal/ Normal  Denies Chronic Renal Disease.                Hepatic/GI:  Hepatic/GI Normal     Denies GERD. Denies Liver Disease.            Musculoskeletal:  Arthritis   Labral tear of shoulder, right,   SLAP lesion of right shoulder,  Biceps tendonitis on right,  Non-traumatic rotator cuff tear, right,  Arthritis of right acromioclavicular joint,  Right shoulder pain,  Displaced fracture of base of fifth metacarpal bone, right hand,  Segmental and somatic dysfunction of cervical region, Other intervertebral disc displacement, thoracic region, Segmental and somatic dysfunction of lumbar and sacral region                      Spine Disorders: cervical, thoracic and lumbar Disc disease           Neurological:  Neurology Normal      Denies Headaches. Denies Seizures.                                Endocrine:  Endocrine Normal Denies Diabetes. Denies Hypothyroidism.          Psych:    depression               No  past medical history on file.  Past Surgical History:   Procedure Laterality Date    COLONOSCOPY N/A 5/25/2023    Procedure: COLONOSCOPY;  Surgeon: Ronda Feliciano MD;  Location: [facility];  Service: Colon and Rectal;  Laterality: N/A;       Anesthesia Assessment: Preoperative EQUATION    Planned Procedure: Procedure(s) (LRB):  REPAIR, ROTATOR CUFF, ARTHROSCOPIC (Right)  DEBRIDEMENT, SHOULDER, ARTHROSCOPIC (Right)  FIXATION, TENDON (Right)  EXCISION, CLAVICLE, DISTAL (Right)  REPAIR, SLAP LESION, SHOULDER (Right)  Requested Anesthesia Type:General  Surgeon: Samia Villalba MD  Service: Orthopedics  Known or anticipated Date of Surgery:1/18/2024    Surgeon notes: reviewed    Electronic QUestionnaire Assessment completed via nurse interview with patient.        Triage considerations:     The patient has no apparent active cardiac condition (No unstable coronary Syndrome such as severe unstable angina or recent [<1 month] myocardial infarction, decompensated CHF, severe valvular   disease or significant arrhythmia)    Previous anesthesia records:MAC and No problems    Last PCP note: within 1 month , outside [facility]   Subspecialty notes: n/a    Other important co-morbidities:  No co-morbidities noted.        EKG 12/19/2023:  Vent. Rate : 110 BPM     Atrial Rate : 110 BPM      P-R Int : 126 ms          QRS Dur : 074 ms       QT Int : 314 ms       P-R-T Axes : 068 079 025 degrees      QTc Int : 424 ms   Sinus tachycardia   Otherwise normal ECG   No previous ECGs available   Confirmed by Lionel Carrillo MD ([id number]) on 12/21/2023 12:44:03 PM        Tests already available:  Results have been reviewed.   [facility] results found within [facility].              Instructions given. (See in Nurse's note)      Ht: 5'10  Wt: 178 lb  BMI: 25.62  Vaccinated

## 2024-01-18 ENCOUNTER — HOSPITAL ENCOUNTER (OUTPATIENT)
Facility: HOSPITAL | Age: 34
Discharge: HOME | End: 2024-01-18
Attending: ORTHOPAEDIC SURGERY | Admitting: ORTHOPAEDIC SURGERY
Payer: OTHER GOVERNMENT

## 2024-01-18 ENCOUNTER — ANESTHESIA (OUTPATIENT)
Dept: SURGERY | Facility: HOSPITAL | Age: 34
End: 2024-01-18
Payer: OTHER GOVERNMENT

## 2024-01-18 VITALS
TEMPERATURE: 98 F | DIASTOLIC BLOOD PRESSURE: 74 MMHG | OXYGEN SATURATION: 100 % | HEIGHT: 70 IN | BODY MASS INDEX: 25.48 KG/M2 | HEART RATE: 92 BPM | WEIGHT: 178 LBS | SYSTOLIC BLOOD PRESSURE: 126 MMHG | RESPIRATION RATE: 17 BRPM

## 2024-01-18 DIAGNOSIS — M25.511 CHRONIC RIGHT SHOULDER PAIN: ICD-10-CM

## 2024-01-18 DIAGNOSIS — G89.29 CHRONIC RIGHT SHOULDER PAIN: ICD-10-CM

## 2024-01-18 DIAGNOSIS — S43.431A SUPERIOR GLENOID LABRUM LESION OF RIGHT SHOULDER, INITIAL ENCOUNTER: Primary | ICD-10-CM

## 2024-01-18 PROBLEM — M75.21 BICEPS TENDONITIS ON RIGHT: Status: ACTIVE | Noted: 2024-01-18

## 2024-01-18 PROBLEM — M19.011 ARTHRITIS OF RIGHT ACROMIOCLAVICULAR JOINT: Status: ACTIVE | Noted: 2024-01-18

## 2024-01-18 PROCEDURE — 63600175 PHARM REV CODE 636 W HCPCS: Performed by: NURSE ANESTHETIST, CERTIFIED REGISTERED

## 2024-01-18 PROCEDURE — 71000015 HC POSTOP RECOV 1ST HR: Performed by: ORTHOPAEDIC SURGERY

## 2024-01-18 PROCEDURE — 37000009 HC ANESTHESIA EA ADD 15 MINS: Performed by: ORTHOPAEDIC SURGERY

## 2024-01-18 PROCEDURE — 94761 N-INVAS EAR/PLS OXIMETRY MLT: CPT

## 2024-01-18 PROCEDURE — 63600175 PHARM REV CODE 636 W HCPCS: Performed by: ANESTHESIOLOGY

## 2024-01-18 PROCEDURE — 63600175 PHARM REV CODE 636 W HCPCS: Performed by: STUDENT IN AN ORGANIZED HEALTH CARE EDUCATION/TRAINING PROGRAM

## 2024-01-18 PROCEDURE — 01630 ANES OPN/ARTHR PX SHO JT NOS: CPT | Performed by: ORTHOPAEDIC SURGERY

## 2024-01-18 PROCEDURE — 71000039 HC RECOVERY, EACH ADD'L HOUR: Performed by: ORTHOPAEDIC SURGERY

## 2024-01-18 PROCEDURE — 25000003 PHARM REV CODE 250: Performed by: STUDENT IN AN ORGANIZED HEALTH CARE EDUCATION/TRAINING PROGRAM

## 2024-01-18 PROCEDURE — C1713 ANCHOR/SCREW BN/BN,TIS/BN: HCPCS | Performed by: ORTHOPAEDIC SURGERY

## 2024-01-18 PROCEDURE — 99900035 HC TECH TIME PER 15 MIN (STAT)

## 2024-01-18 PROCEDURE — D9220A PRA ANESTHESIA: Mod: ANES,,, | Performed by: STUDENT IN AN ORGANIZED HEALTH CARE EDUCATION/TRAINING PROGRAM

## 2024-01-18 PROCEDURE — 36000710: Performed by: ORTHOPAEDIC SURGERY

## 2024-01-18 PROCEDURE — 25000003 PHARM REV CODE 250: Performed by: NURSE ANESTHETIST, CERTIFIED REGISTERED

## 2024-01-18 PROCEDURE — 25000003 PHARM REV CODE 250: Performed by: ORTHOPAEDIC SURGERY

## 2024-01-18 PROCEDURE — 36000711: Performed by: ORTHOPAEDIC SURGERY

## 2024-01-18 PROCEDURE — 25000003 PHARM REV CODE 250: Performed by: PHYSICIAN ASSISTANT

## 2024-01-18 PROCEDURE — 37000008 HC ANESTHESIA 1ST 15 MINUTES: Performed by: ORTHOPAEDIC SURGERY

## 2024-01-18 PROCEDURE — 63600175 PHARM REV CODE 636 W HCPCS: Performed by: ORTHOPAEDIC SURGERY

## 2024-01-18 PROCEDURE — 29824 SHO ARTHRS SRG DSTL CLAVICLC: CPT | Mod: 51,RT,, | Performed by: ORTHOPAEDIC SURGERY

## 2024-01-18 PROCEDURE — D9220A PRA ANESTHESIA: Mod: CRNA,,, | Performed by: NURSE ANESTHETIST, CERTIFIED REGISTERED

## 2024-01-18 PROCEDURE — 27201423 OPTIME MED/SURG SUP & DEVICES STERILE SUPPLY: Performed by: ORTHOPAEDIC SURGERY

## 2024-01-18 PROCEDURE — 29806 SHO ARTHRS SRG CAPSULORRAPHY: CPT | Mod: 22,RT,, | Performed by: ORTHOPAEDIC SURGERY

## 2024-01-18 PROCEDURE — 71000033 HC RECOVERY, INTIAL HOUR: Performed by: ORTHOPAEDIC SURGERY

## 2024-01-18 PROCEDURE — 29826 SHO ARTHRS SRG DECOMPRESSION: CPT | Mod: RT,,, | Performed by: ORTHOPAEDIC SURGERY

## 2024-01-18 DEVICE — ANCHOR SUT FIBERTAK 1.8 KNTLS: Type: IMPLANTABLE DEVICE | Site: SHOULDER | Status: FUNCTIONAL

## 2024-01-18 DEVICE — IMPLANTABLE DEVICE: Type: IMPLANTABLE DEVICE | Site: SHOULDER | Status: FUNCTIONAL

## 2024-01-18 RX ORDER — DEXAMETHASONE SODIUM PHOSPHATE 4 MG/ML
INJECTION, SOLUTION INTRA-ARTICULAR; INTRALESIONAL; INTRAMUSCULAR; INTRAVENOUS; SOFT TISSUE
Status: DISCONTINUED | OUTPATIENT
Start: 2024-01-18 | End: 2024-01-18

## 2024-01-18 RX ORDER — ROPIVACAINE HYDROCHLORIDE 5 MG/ML
INJECTION, SOLUTION EPIDURAL; INFILTRATION; PERINEURAL
Status: DISCONTINUED | OUTPATIENT
Start: 2024-01-18 | End: 2024-01-18 | Stop reason: HOSPADM

## 2024-01-18 RX ORDER — KETAMINE HYDROCHLORIDE 100 MG/ML
INJECTION, SOLUTION INTRAMUSCULAR; INTRAVENOUS
Status: DISCONTINUED | OUTPATIENT
Start: 2024-01-18 | End: 2024-01-18 | Stop reason: HOSPADM

## 2024-01-18 RX ORDER — CLINDAMYCIN HYDROCHLORIDE 150 MG/1
450 CAPSULE ORAL
Status: COMPLETED | OUTPATIENT
Start: 2024-01-18 | End: 2024-01-18

## 2024-01-18 RX ORDER — OXYCODONE HYDROCHLORIDE 5 MG/1
5 TABLET ORAL
Status: DISCONTINUED | OUTPATIENT
Start: 2024-01-18 | End: 2024-01-18 | Stop reason: HOSPADM

## 2024-01-18 RX ORDER — PREGABALIN 75 MG/1
75 CAPSULE ORAL
Status: COMPLETED | OUTPATIENT
Start: 2024-01-18 | End: 2024-01-18

## 2024-01-18 RX ORDER — FENTANYL CITRATE 50 UG/ML
25 INJECTION, SOLUTION INTRAMUSCULAR; INTRAVENOUS EVERY 5 MIN PRN
Status: DISCONTINUED | OUTPATIENT
Start: 2024-01-18 | End: 2024-01-18 | Stop reason: HOSPADM

## 2024-01-18 RX ORDER — KETAMINE HCL IN 0.9 % NACL 50 MG/5 ML
SYRINGE (ML) INTRAVENOUS
Status: DISCONTINUED | OUTPATIENT
Start: 2024-01-18 | End: 2024-01-18

## 2024-01-18 RX ORDER — LIDOCAINE HYDROCHLORIDE 20 MG/ML
INJECTION INTRAVENOUS
Status: DISCONTINUED | OUTPATIENT
Start: 2024-01-18 | End: 2024-01-18

## 2024-01-18 RX ORDER — PROMETHAZINE HYDROCHLORIDE 25 MG/1
25 TABLET ORAL EVERY 6 HOURS PRN
Status: DISCONTINUED | OUTPATIENT
Start: 2024-01-18 | End: 2024-01-18 | Stop reason: HOSPADM

## 2024-01-18 RX ORDER — OXYCODONE HCL 10 MG/1
10 TABLET, FILM COATED, EXTENDED RELEASE ORAL
Status: COMPLETED | OUTPATIENT
Start: 2024-01-18 | End: 2024-01-18

## 2024-01-18 RX ORDER — CEFAZOLIN SODIUM 1 G/3ML
INJECTION, POWDER, FOR SOLUTION INTRAMUSCULAR; INTRAVENOUS
Status: DISCONTINUED | OUTPATIENT
Start: 2024-01-18 | End: 2024-01-18

## 2024-01-18 RX ORDER — ROCURONIUM BROMIDE 10 MG/ML
INJECTION, SOLUTION INTRAVENOUS
Status: DISCONTINUED | OUTPATIENT
Start: 2024-01-18 | End: 2024-01-18

## 2024-01-18 RX ORDER — PROPOFOL 10 MG/ML
VIAL (ML) INTRAVENOUS
Status: DISCONTINUED | OUTPATIENT
Start: 2024-01-18 | End: 2024-01-18

## 2024-01-18 RX ORDER — NEOSTIGMINE METHYLSULFATE 0.5 MG/ML
INJECTION, SOLUTION INTRAVENOUS
Status: DISCONTINUED | OUTPATIENT
Start: 2024-01-18 | End: 2024-01-18

## 2024-01-18 RX ORDER — ONDANSETRON HYDROCHLORIDE 2 MG/ML
4 INJECTION, SOLUTION INTRAVENOUS EVERY 12 HOURS PRN
Status: DISCONTINUED | OUTPATIENT
Start: 2024-01-18 | End: 2024-01-18 | Stop reason: HOSPADM

## 2024-01-18 RX ORDER — HYDROMORPHONE HYDROCHLORIDE 1 MG/ML
0.2 INJECTION, SOLUTION INTRAMUSCULAR; INTRAVENOUS; SUBCUTANEOUS EVERY 5 MIN PRN
Status: DISCONTINUED | OUTPATIENT
Start: 2024-01-18 | End: 2024-01-18 | Stop reason: HOSPADM

## 2024-01-18 RX ORDER — PHENYLEPHRINE HYDROCHLORIDE 10 MG/ML
INJECTION INTRAVENOUS
Status: DISCONTINUED | OUTPATIENT
Start: 2024-01-18 | End: 2024-01-18

## 2024-01-18 RX ORDER — ONDANSETRON HYDROCHLORIDE 2 MG/ML
INJECTION, SOLUTION INTRAVENOUS
Status: DISCONTINUED | OUTPATIENT
Start: 2024-01-18 | End: 2024-01-18

## 2024-01-18 RX ORDER — ONDANSETRON HYDROCHLORIDE 2 MG/ML
4 INJECTION, SOLUTION INTRAVENOUS DAILY PRN
Status: DISCONTINUED | OUTPATIENT
Start: 2024-01-18 | End: 2024-01-18 | Stop reason: HOSPADM

## 2024-01-18 RX ORDER — PREGABALIN 75 MG/1
75 CAPSULE ORAL ONCE
Status: COMPLETED | OUTPATIENT
Start: 2024-01-18 | End: 2024-01-18

## 2024-01-18 RX ORDER — MORPHINE SULFATE 2 MG/ML
2 INJECTION, SOLUTION INTRAMUSCULAR; INTRAVENOUS EVERY 10 MIN PRN
Status: DISCONTINUED | OUTPATIENT
Start: 2024-01-18 | End: 2024-01-18 | Stop reason: HOSPADM

## 2024-01-18 RX ORDER — FENTANYL CITRATE 50 UG/ML
INJECTION, SOLUTION INTRAMUSCULAR; INTRAVENOUS
Status: DISCONTINUED | OUTPATIENT
Start: 2024-01-18 | End: 2024-01-18

## 2024-01-18 RX ORDER — FAMOTIDINE 10 MG/ML
20 INJECTION INTRAVENOUS
Status: COMPLETED | OUTPATIENT
Start: 2024-01-18 | End: 2024-01-18

## 2024-01-18 RX ORDER — OXYCODONE HYDROCHLORIDE 5 MG/1
10 TABLET ORAL EVERY 4 HOURS PRN
Status: DISCONTINUED | OUTPATIENT
Start: 2024-01-18 | End: 2024-01-18 | Stop reason: HOSPADM

## 2024-01-18 RX ORDER — HALOPERIDOL 5 MG/ML
0.5 INJECTION INTRAMUSCULAR EVERY 10 MIN PRN
Status: COMPLETED | OUTPATIENT
Start: 2024-01-18 | End: 2024-01-18

## 2024-01-18 RX ORDER — SODIUM CHLORIDE 9 MG/ML
INJECTION, SOLUTION INTRAVENOUS CONTINUOUS
Status: DISCONTINUED | OUTPATIENT
Start: 2024-01-18 | End: 2024-01-18 | Stop reason: HOSPADM

## 2024-01-18 RX ORDER — DEXMEDETOMIDINE HYDROCHLORIDE 100 UG/ML
INJECTION, SOLUTION INTRAVENOUS
Status: DISCONTINUED | OUTPATIENT
Start: 2024-01-18 | End: 2024-01-18

## 2024-01-18 RX ORDER — TRAMADOL HYDROCHLORIDE 50 MG/1
100 TABLET ORAL EVERY 6 HOURS PRN
Status: DISCONTINUED | OUTPATIENT
Start: 2024-01-18 | End: 2024-01-18 | Stop reason: HOSPADM

## 2024-01-18 RX ORDER — MIDAZOLAM HYDROCHLORIDE 1 MG/ML
INJECTION, SOLUTION INTRAMUSCULAR; INTRAVENOUS
Status: DISCONTINUED | OUTPATIENT
Start: 2024-01-18 | End: 2024-01-18

## 2024-01-18 RX ORDER — EPINEPHRINE 1 MG/ML
INJECTION, SOLUTION, CONCENTRATE INTRAVENOUS
Status: DISCONTINUED | OUTPATIENT
Start: 2024-01-18 | End: 2024-01-18 | Stop reason: HOSPADM

## 2024-01-18 RX ORDER — KETOROLAC TROMETHAMINE 30 MG/ML
INJECTION, SOLUTION INTRAMUSCULAR; INTRAVENOUS
Status: DISCONTINUED | OUTPATIENT
Start: 2024-01-18 | End: 2024-01-18 | Stop reason: HOSPADM

## 2024-01-18 RX ADMIN — HYDROMORPHONE HYDROCHLORIDE 0.2 MG: 1 INJECTION, SOLUTION INTRAMUSCULAR; INTRAVENOUS; SUBCUTANEOUS at 01:01

## 2024-01-18 RX ADMIN — DEXAMETHASONE SODIUM PHOSPHATE 8 MG: 4 INJECTION, SOLUTION INTRAMUSCULAR; INTRAVENOUS at 09:01

## 2024-01-18 RX ADMIN — LIDOCAINE HYDROCHLORIDE 100 MG: 20 INJECTION INTRAVENOUS at 09:01

## 2024-01-18 RX ADMIN — CLINDAMYCIN HYDROCHLORIDE 450 MG: 150 CAPSULE ORAL at 08:01

## 2024-01-18 RX ADMIN — ROCURONIUM BROMIDE 40 MG: 10 INJECTION INTRAVENOUS at 09:01

## 2024-01-18 RX ADMIN — DEXMEDETOMIDINE 8 MCG: 100 INJECTION, SOLUTION, CONCENTRATE INTRAVENOUS at 11:01

## 2024-01-18 RX ADMIN — HALOPERIDOL LACTATE 0.5 MG: 5 INJECTION, SOLUTION INTRAMUSCULAR at 03:01

## 2024-01-18 RX ADMIN — CEFAZOLIN 2 G: 330 INJECTION, POWDER, FOR SOLUTION INTRAMUSCULAR; INTRAVENOUS at 10:01

## 2024-01-18 RX ADMIN — PHENYLEPHRINE HYDROCHLORIDE 100 MCG: 10 INJECTION INTRAVENOUS at 10:01

## 2024-01-18 RX ADMIN — PREGABALIN 75 MG: 75 CAPSULE ORAL at 08:01

## 2024-01-18 RX ADMIN — SODIUM CHLORIDE, SODIUM GLUCONATE, SODIUM ACETATE, POTASSIUM CHLORIDE, MAGNESIUM CHLORIDE, SODIUM PHOSPHATE, DIBASIC, AND POTASSIUM PHOSPHATE: .53; .5; .37; .037; .03; .012; .00082 INJECTION, SOLUTION INTRAVENOUS at 10:01

## 2024-01-18 RX ADMIN — SODIUM CHLORIDE: 0.9 INJECTION, SOLUTION INTRAVENOUS at 08:01

## 2024-01-18 RX ADMIN — SODIUM CHLORIDE: 0.9 INJECTION, SOLUTION INTRAVENOUS at 09:01

## 2024-01-18 RX ADMIN — MIDAZOLAM HYDROCHLORIDE 2 MG: 1 INJECTION, SOLUTION INTRAMUSCULAR; INTRAVENOUS at 09:01

## 2024-01-18 RX ADMIN — OXYCODONE HYDROCHLORIDE 10 MG: 10 TABLET, FILM COATED, EXTENDED RELEASE ORAL at 08:01

## 2024-01-18 RX ADMIN — NEOSTIGMINE METHYLSULFATE 5 MG: 0.5 INJECTION INTRAVENOUS at 11:01

## 2024-01-18 RX ADMIN — HALOPERIDOL LACTATE 0.5 MG: 5 INJECTION, SOLUTION INTRAMUSCULAR at 01:01

## 2024-01-18 RX ADMIN — Medication 20 MG: at 11:01

## 2024-01-18 RX ADMIN — FENTANYL CITRATE 100 MCG: 50 INJECTION, SOLUTION INTRAMUSCULAR; INTRAVENOUS at 09:01

## 2024-01-18 RX ADMIN — Medication 30 MG: at 09:01

## 2024-01-18 RX ADMIN — OXYCODONE HYDROCHLORIDE 5 MG: 5 TABLET ORAL at 12:01

## 2024-01-18 RX ADMIN — ONDANSETRON 4 MG: 2 INJECTION INTRAMUSCULAR; INTRAVENOUS at 09:01

## 2024-01-18 RX ADMIN — PROPOFOL 100 MG: 10 INJECTION, EMULSION INTRAVENOUS at 11:01

## 2024-01-18 RX ADMIN — PREGABALIN 75 MG: 75 CAPSULE ORAL at 12:01

## 2024-01-18 RX ADMIN — DEXMEDETOMIDINE 8 MCG: 100 INJECTION, SOLUTION, CONCENTRATE INTRAVENOUS at 09:01

## 2024-01-18 RX ADMIN — DEXMEDETOMIDINE 4 MCG: 100 INJECTION, SOLUTION, CONCENTRATE INTRAVENOUS at 09:01

## 2024-01-18 RX ADMIN — PROPOFOL 200 MG: 10 INJECTION, EMULSION INTRAVENOUS at 09:01

## 2024-01-18 RX ADMIN — GLYCOPYRROLATE 0.6 MG: 0.2 INJECTION, SOLUTION INTRAMUSCULAR; INTRAVENOUS at 11:01

## 2024-01-18 RX ADMIN — FAMOTIDINE 20 MG: 10 INJECTION, SOLUTION INTRAVENOUS at 08:01

## 2024-01-18 NOTE — ANESTHESIA PREPROCEDURE EVALUATION
01/18/2024  Pre-operative evaluation for Procedure(s) (LRB):  REPAIR, ROTATOR CUFF, ARTHROSCOPIC (Right)  DEBRIDEMENT, SHOULDER, ARTHROSCOPIC (Right)  FIXATION, TENDON (Right)  EXCISION, CLAVICLE, DISTAL (Right)  REPAIR, SLAP LESION, SHOULDER (Right)    Jasson Velasco is a 33 y.o. male     Patient Active Problem List   Diagnosis    Depression, unspecified    Fistula-in-ano    Anal fissure    Right shoulder pain       Review of patient's allergies indicates:  No Known Allergies    No current facility-administered medications on file prior to encounter.     Current Outpatient Medications on File Prior to Encounter   Medication Sig Dispense Refill    benzonatate (TESSALON) 100 MG capsule Take 100 mg by mouth 3 (three) times daily as needed.      cetirizine (ZYRTEC) 10 MG tablet Take 10 mg by mouth daily as needed for Allergies.      fluticasone propionate (FLONASE) 50 mcg/actuation nasal spray 2 sprays by Each Nostril route daily as needed for Rhinitis. Shake Liquid and use      venlafaxine (EFFEXOR-XR) 37.5 MG 24 hr capsule Take 37.5 mg by mouth once daily.      albuterol (PROVENTIL/VENTOLIN HFA) 90 mcg/actuation inhaler Inhale 1-2 puffs into the lungs every 6 (six) hours as needed. Rescue 18 g 0    aspirin (ECOTRIN) 81 MG EC tablet Take 1 tablet (81 mg total) by mouth once daily. For 4 weeks starting the day after surgery. 28 tablet 0    buPROPion (WELLBUTRIN XL) 150 MG TB24 tablet Do not drink alcohol.Take or use exactly as directed.Obtain advice for OTCs.May impair driving.Swallow whole.Check with your doctor before becoming pregnant.      buPROPion (WELLBUTRIN XL) 300 MG 24 hr tablet Do not drink alcohol.Take or use exactly as directed.Obtain advice for OTCs.May impair driving.Swallow whole.Check with your doctor before becoming pregnant.      diltiazem HCl (DILTIAZEM 2% CREAM) Apply topically 3 (three) times  "daily. Apply topically to anal area. (Patient not taking: Reported on 2024) 30 g 3    ibuprofen (ADVIL,MOTRIN) 600 MG tablet Take 1 tablet (600 mg total) by mouth every 6 (six) hours as needed for Pain. 20 tablet 0    meloxicam (MOBIC) 15 MG tablet Take 15 mg by mouth daily as needed.      meloxicam (MOBIC) 7.5 MG tablet Take 1 tablet (7.5 mg total) by mouth once daily. (Patient not taking: Reported on 2024) 12 tablet 0    oxyCODONE-acetaminophen (PERCOCET)  mg per tablet Take 1 tablet by mouth every 4-6 hours as needed for pain. Take stool softener with this medication. 21 tablet 0    phenoL (CHLORASEPTIC THROAT SPRAY) 1.4 % SprA by Mucous Membrane route every 2 (two) hours. (Patient not taking: Reported on 2024) 177 mL 0    promethazine (PHENERGAN) 25 MG tablet Take 1 tablet (25 mg total) by mouth every 6 (six) hours as needed for Nausea. 8 tablet 0    traMADoL (ULTRAM) 50 mg tablet Take 1-2 tablets ( mg total) by mouth every 6 (six) hours as needed for Pain. 21 tablet 0       Past Surgical History:   Procedure Laterality Date    COLONOSCOPY N/A 2023    Procedure: COLONOSCOPY;  Surgeon: Ronda Feliciano MD;  Location: [facility];  Service: Colon and Rectal;  Laterality: N/A;       Social History     Socioeconomic History    Marital status:    Tobacco Use    Smoking status: Never    Smokeless tobacco: Never   Substance and Sexual Activity    Alcohol use: Not Currently    Drug use: Never    Sexual activity: Yes     Partners: Female         CBC: No results for input(s): "WBC", "RBC", "HGB", "HCT", "PLT", "MCV", "MCH", "MCHC" in the last 72 hours.    CMP: No results for input(s): "NA", "K", "CL", "CO2", "BUN", "CREATININE", "GLU", "MG", "PHOS", "CALCIUM", "ALBUMIN", "PROT", "ALKPHOS", "ALT", "AST", "BILITOT" in the last 72 hours.    INR  No results for input(s): "PT", "INR", "PROTIME", "APTT" in the last 72 hours.        Diagnostic Studies:      EKD Echo:  No results found for " this or any previous visit.       Pre-op Assessment    I have reviewed the Patient Summary Reports.     I have reviewed the Nursing Notes. I have reviewed the NPO Status.   I have reviewed the Medications.     Review of Systems      Physical Exam  General: Well nourished and Cooperative    Airway:  Mallampati: II   Mouth Opening: Normal  TM Distance: Normal  Tongue: Normal  Neck ROM: Normal ROM    Chest/Lungs:  Clear to auscultation, Normal Respiratory Rate    Heart:  Rate: Normal  Rhythm: Regular Rhythm  Sounds: Normal        Anesthesia Plan  Type of Anesthesia, risks & benefits discussed:    Anesthesia Type: Gen ETT  Intra-op Monitoring Plan: Standard ASA Monitors  Post Op Pain Control Plan: multimodal analgesia and IV/PO Opioids PRN  Induction:  IV  Airway Plan: Direct and Video, Post-Induction  Informed Consent: Informed consent signed with the Patient and all parties understand the risks and agree with anesthesia plan.  All questions answered.   ASA Score: 1    Ready For Surgery From Anesthesia Perspective.     .

## 2024-01-18 NOTE — OP NOTE
DATE OF PROCEDURE: 1/18/2024     SURGEON: Samia Villalba M.D.    ASSISTANT: SOFIA Gomez M.D.,PGY 3  ASSISTANT: JACINTA Heller PA-C      PREOPERATIVE DIAGNOSES:   right  1. Shoulder posterior instability  2. Shoulder synovitis  3. Shoulder SLAP type 8  4. Shoulder adhesions  5. Shoulder AC arthritis    POSTOPERATIVE DIAGNOSES:   right  1. Shoulder posterior instability  2. Shoulder synovitis  3. Shoulder SLAP type 8  4. Shoulder adhesions  5. Shoulder AC arthritis  6. Shoulder loose body    OPERATION:   right  1. Shoulder arthroscopic posterior labral repair, capsulorrhaphy, 7:00 position (CPT 42939)  2. Shoulder arthroscopic SLAP type 8 repair (CPT 87859)  3. Shoulder arthroscopic extensive debridement (anterior, posterior glenohumeral joint, subacromial space) (CPT 51943)  4. Shoulder manipulation under anesthesia (CPT 31429)  5. Shoulder arthroscopic lysis of adhesions (CPT 49536)  6. Shoulder arthroscopic loose body removal  7. Shoulder arthroscopic distal clavicle excision (CPT 23033)  8. Shoulder arthroscopic subacromial decompression, bursectomy     ANESTHESIA:  General with intra-op suprascapular nerve block    BLOOD LOSS: Minimal.     DRAINS: None.     TOURNIQUET TIME: None.     COMPLICATIONS: None. The patient was moved to the recovery room in stable condition with compartments soft and cap refill less than a second in all digits.      COMPLEX PROCEDURE:  Labral repair and capsulorrhaphy was complex in nature due to the chronic nature of this case and remnant labral tissue lesion tissue and altered anatomy of this case.  There was an altered surgical field with abnormal anatomy. There was an altered surgical field due to the complex labral tear. There was abnormal anatomy. Complexity of the service was much greater than the normative procedure. There was increased time, intensity and technical difficulty of the procedure, severity of the patient's condition and mental effort required.  This was a highly  complicated repair and tear pattern that required advanced arthroscopic skill in order to safely and technically perform it. Nevertheless the repair achieved was excellent    BRIEF INDICATION OF MEDICAL NECESSITY: The patient is a 33 y.o. year-old male who has history and physical examination findings consistent with the above. Nonoperative versus operative options were discussed. The risks and benefits were discussed with the patient. The patient acknowledged understanding and wished to proceed with operative intervention. Informed consent was obtained prior to the procedure. Reasonable expectations and potential complications were discussed and acknowledged, including but not limited to infection, bleeding, blood clots, (DVT and/or PE), nerve injury, re-tear, instability, continued pain and stiffness. They agreed and   understood and wished to proceed.     EXAMINATION UNDER ANESTHESIA OF THE right SHOULDER: Forward elevation 175 degrees, External rotation at 0-60 degrees, External rotation at 90-90 degrees, Internal rotation at 90-60 degrees. Translation testing: anterior grade 2, posterior grade 1, sulcus sign grade 1 corrects to 0 on external rotation.     EXAMINATION UNDER ANESTHESIA OF THE NONOPERATED left SHOULDER: Forward elevation 175 degrees, External rotation at 0-60 degrees, External rotation at 0-90 degrees, Internal rotation at 90-60 degrees. Translation testing: anterior grade 1, posterior grade 1, sulcus sign grade 1 corrects to 0 on external rotation.      PROCEDURE IN DETAIL:  After the correct operative site was marked by the operating surgeon. The patient was then taken to the operating room and placed supine on the operating room table, where the patient  underwent general anesthesia by the anesthesia team.  The patient was then rolled into the lateral decubitus position with the operative side up.  A well-padded axillary roll, beanbag and pillows were placed.  All pressure points were carefully  padded and checked.  The upper extremities and both lower extremities were placed in comfortable positions and were also well-padded.  The operative upper extremity was then prepped and draped in the usual sterile fashion.     Suprascapular nerve block was performed in the standard fashion with 5cc local anesthetic.    The Spider arm positioner was implemented with balanced suspension and appropriate landmarks were noted on the skin. A posterior followed by nargis-superior portals were created and systematic examination of the joint revealed the following:     There was no evidence of any significant chondral lesions to the glenoid or humeral head.      There was a SLAP type 8 lesion noted to the biceps root upon probing and careful inspection.      COMPLEX PROCEDURE:  Labral repair was complex in nature due to the chronic nature of this case and remnant labral tissue lesion tissue and altered anatomy of this case.  There was an altered surgical field with abnormal anatomy.   There was an altered surgical field due to the complex labral tear. There was abnormal anatomy. Complexity of the service was much greater than the normative procedure. There was increased time, intensity and technical difficulty of the procedure, severity of the patient's condition and mental effort required.  This was a highly complicated repair and tear pattern that required advanced arthroscopic skill in order to safely and technically perform it. Nevertheless the repair achieved was excellent     Adhesions were cleared off labrum anteriorly and capsule was able to be mobilized after lysis of adhesions.     Cannulas were placed and a shaver was then used to roughen and debride the surface of the posterior glenoid neck surfaces. Then 1-2 mm of articular surface was debrided for glenoid anchor placement.      There was synovitis in the shoulder anteriorly and posteriorly and was debrided anteriorly and posteriorly in the glenohumeral joint to  the areas of concern.    An inferior transcutaneous 3-mm portal was created to place the inferior anchors. Care was taken to avoid any damage to the neurovascular structures, axillary nerve, below.      Internal impingement debridement was performed to cuff (20% and labrum post-superiorly).    2 anchors were placed in total, posteriorly at the 9:00 position and 10:00 positions.      These were 1.6 mm Arthrex FiberTak soft anchors and a simple suture was placed through the labrum and capsule, and labral repair and capsulorrhaphy was performed. Care was taken to avoid the neurovascular structures and axillary nerve bundles below. Suture was then tied using modified Naun knots with the knots from the tissue side. All knots had good loop security with repair starting posteriorly and then sequentially tensioned.     The repair recentered the humeral head nicely on the glenoid. The shoulder was stable on translation testing.     A medial as possible transcutaneous 3-mm portal was created to place the 1 superior SLAP type 8 anchors, 1 at the 1 o'clock position. This was 1.6 mm Arthrex FiberTak soft anchor and a simple knotless suture was placed with repair posteriorly.  This gave 1 suture holding the SLAP repair and the labrum was secured and was probed and found to be quite stable.      3 anchors were used in total.     This recentered the humeral head nicely on the glenoid. The shoulder was stable to translation testing.     Attention was then turned to the subacromial space where a significant hypertrophic bursa was encountered.  The bursa itself was thickened and hypertrophic.  A lateral portal was created to assist with the bursectomy.  Subacromial decompression was completed using three-portal technique in the standard fashion with a 4.5 mm aryan without difficulty.  The anterior osteophyte was flattened.  Confirmation of adequate resection was confirmed while viewing from the lateral portal.      Next, attention  was then turned to the distal clavicle excision.  A thermal device was used to clear the soft tissue of the length of the AC joint approximately 1cm medial to the end of the distal end of the clavicle.  The anterior portal which was established earlier was used to perform the AC resection at the level of the AC joint  with arthroscopic aryan.  The adequacy of the the resection was confirmed while viewing from the anterior portal.  Care was taken to resect enough postero-superiorly.       Local 5cc placed in to the joint along with suprascapular nerve block was performed in the standard fashion also with 5cc local anesthetic. Local was placed about portals and incision(s) after irrigation, as described below, was completed. The shoulder was then irrigated and fluid was extravasated using suction. All portals were reapproximated using inverted 4-0 Monocryl suture in the subcutaneous tissue of the portals. Mastisol and Steri-strips were placed with xeroform, 4x4s, abd pad, and Medi-pore tape.  TENS unit pads were placed which were medically necessary for pain relief.  An iceman was secured in the shoulder diamond.  A sling with an abduction pillow was secured.  The patient was then moved to supine, extubated and taken to the recovery room where the patient arrived in stable condition with the compartments of the arm and forearm soft and cap refill less than a second in all digits.     POST OPERATIVE PLAN: We will follow the arthroscopic posterior labral repair with biceps tenodesis and SLAP repair guidelines. We discussed with the patient's family after surgery. The patient will remain in a sling for 6 weeks. We will start PT at the 2 week nery.     Quality of tissue: Good    Quality of the repair: Good

## 2024-01-18 NOTE — BRIEF OP NOTE
[facility] Surgery ([facility])  Brief Operative Note    Surgery Date: 1/18/2024     Surgeon(s) and Role:     * Samia Villalba MD - Primary    Assisting Surgeon: None    Pre-op Diagnosis:  Labral tear of shoulder, right, initial encounter [S43.431A]  SLAP lesion of right shoulder [S43.431A]  Biceps tendonitis on right [M75.21]  Non-traumatic rotator cuff tear, right [M75.101]  Arthritis of right acromioclavicular joint [M19.011]    Post-op Diagnosis:  Post-Op Diagnosis Codes:     * Labral tear of shoulder, right, initial encounter [S43.431A]     * SLAP lesion of right shoulder [S43.431A]     * Biceps tendonitis on right [M75.21]     * Non-traumatic rotator cuff tear, right [M75.101]     * Arthritis of right acromioclavicular joint [M19.011]    Procedure(s) (LRB):  REPAIR, ROTATOR CUFF, ARTHROSCOPIC (Right)  DEBRIDEMENT, SHOULDER, ARTHROSCOPIC (Right)  FIXATION, TENDON (Right)  EXCISION, CLAVICLE, DISTAL (Right)  REPAIR, SLAP LESION, SHOULDER (Right)    Anesthesia: General    Operative Findings: right shoulder posterior superior labral repair, biceps tenodesis and distal clavicle excision    Estimated Blood Loss: * No values recorded between 1/18/2024  9:17 AM and 1/18/2024 12:03 PM *         Specimens:   Specimen (24h ago, onward)      None              Discharge Note    OUTCOME: Patient tolerated treatment/procedure well without complication and is now ready for discharge.    DISPOSITION: Home or Self Care    FINAL DIAGNOSIS:  Labral tear of shoulder, right, initial encounter    FOLLOWUP: In clinic    DISCHARGE INSTRUCTIONS:    Discharge Procedure Orders   Diet general     Call MD for:  temperature >100.4     Call MD for:  persistent nausea and vomiting     Call MD for:  severe uncontrolled pain     Call MD for:  difficulty breathing, headache or visual disturbances     Call MD for:  redness, tenderness, or signs of infection (pain, swelling, redness, odor or green/yellow discharge around incision site)     Call MD for:   hives     Call MD for:  persistent dizziness or light-headedness     Ice to affected area   Order Comments: using barrier between ice and skin (specify duration&frequency)     Remove dressing in 72 hours

## 2024-01-18 NOTE — TRANSFER OF CARE
"Anesthesia Transfer of Care Note    Patient: Jasson Velasco    Procedure(s) Performed: Procedure(s) (LRB):  REPAIR, ROTATOR CUFF, ARTHROSCOPIC (Right)  DEBRIDEMENT, SHOULDER, ARTHROSCOPIC (Right)  FIXATION, TENDON (Right)  EXCISION, CLAVICLE, DISTAL (Right)  REPAIR, SLAP LESION, SHOULDER (Right)    Patient location: PACU    Anesthesia Type: general    Transport from OR: Transported from OR on 6-10 L/min O2 by face mask with adequate spontaneous ventilation    Post pain: adequate analgesia    Post assessment: no apparent anesthetic complications and tolerated procedure well    Post vital signs: stable    Level of consciousness: awake, alert and oriented    Nausea/Vomiting: no nausea/vomiting    Complications: none    Transfer of care protocol was followed      Last vitals: Visit Vitals  BP (!) 131/91 (BP Location: Left arm, Patient Position: Lying)   Pulse 85   Temp 36.6 °C (97.9 °F) (Temporal)   Resp 12   Ht 5' 10" (1.778 m)   Wt 80.7 kg (178 lb)   SpO2 100%   BMI 25.54 kg/m²     "

## 2024-01-18 NOTE — ANESTHESIA POSTPROCEDURE EVALUATION
Anesthesia Post Evaluation    Patient: Jasson Velasco    Procedure(s) Performed: Procedure(s) (LRB):  DEBRIDEMENT, ROTATOR CUFF, SHOULDER, ARTHROSCOPIC (Right)  EXCISION, CLAVICLE, DISTAL (Right)  ARTHROSCOPY, SHOULDER, WITH SLAP REPAIR (Right)  INSTIBILITY REPAIR, ARTHROSCOPY SHOULDER (Right)  ARTHROSCOPY, SHOULDER, WITH SUBACROMIAL DECOMPRESSION (Right)    Final Anesthesia Type: general      Patient location during evaluation: PACU  Patient participation: Yes- Able to Participate  Level of consciousness: awake and alert  Post-procedure vital signs: reviewed and stable  Pain management: adequate  Airway patency: patent  RUDDY mitigation strategies: Multimodal analgesia  PONV status at discharge: No PONV  Anesthetic complications: no      Cardiovascular status: blood pressure returned to baseline  Respiratory status: unassisted  Hydration status: euvolemic  Follow-up not needed.              Vitals Value Taken Time   /74 01/18/24 1446   Temp 36.5 °C (97.7 °F) 01/18/24 1218   Pulse 82 01/18/24 1449   Resp 13 01/18/24 1448   SpO2 99 % 01/18/24 1449   Vitals shown include unvalidated device data.      No case tracking events are documented in the log.      Pain/Guille Score: Pain Rating Prior to Med Admin: 10 (1/18/2024  1:31 PM)  Pain Rating Post Med Admin: 10 (1/18/2024  1:30 PM)  Guille Score: 7 (1/18/2024  1:30 PM)

## 2024-01-18 NOTE — ANESTHESIA PROCEDURE NOTES
Intubation    Date/Time: 1/18/2024 9:40 AM    Performed by: Simi Paul CRNA  Authorized by: Ezra Elliott MD    Intubation:     Induction:  Intravenous    Intubated:  Postinduction    Mask Ventilation:  Easy mask    Attempts:  1    Attempted By:  CRNA    Method of Intubation:  Video laryngoscopy    Blade:  Chinchilla 3    Laryngeal View Grade: Grade I - full view of cords      Difficult Airway Encountered?: No      Complications:  None    Airway Device:  Oral endotracheal tube    Airway Device Size:  7.5    Style/Cuff Inflation:  Cuffed (inflated to minimal occlusive pressure)    Inflation Amount (mL):  6    Tube secured:  22    Secured at:  The lips    Placement Verified By:  Capnometry    Complicating Factors:  None    Findings Post-Intubation:  BS equal bilateral and atraumatic/condition of teeth unchanged

## 2024-01-18 NOTE — PLAN OF CARE
Patient prepped for procedure.  POC reviewed with pt and his wife, who verbalized understanding.    Outpatient pharmacy confirmed.  Kasey guadalupe refused.     Spouse to keep all belongings during procedure.     Side rails up x2, call light in reach.

## 2024-01-18 NOTE — PLAN OF CARE
VSS.  Patient tolerating oral liquids without difficulty.   No apparent s&s of distress noted at this time, no complaints voiced at this time.   Discharge instructions reviewed with patient and spouse with good verbal feedback received.   Post op medications bedside delivery  Patient ready for discharge.

## 2024-01-22 ENCOUNTER — PATIENT MESSAGE (OUTPATIENT)
Dept: SPORTS MEDICINE | Facility: CLINIC | Age: 34
End: 2024-01-22
Payer: OTHER GOVERNMENT

## 2024-01-22 NOTE — OP NOTE
DATE OF PROCEDURE: 1/18/2024     SURGEON: Samia Villalba M.D.    ASSISTANT: SOFIA Gomez M.D.,PGY 3  ASSISTANT: JACINTA Heller PA-C      PREOPERATIVE DIAGNOSES:   right  1. Shoulder posterior instability  2. Shoulder synovitis  3. Shoulder SLAP type 8  4. Shoulder adhesions  5. Shoulder AC arthritis    POSTOPERATIVE DIAGNOSES:   right  1. Shoulder posterior instability  2. Shoulder synovitis  3. Shoulder SLAP type 8  4. Shoulder adhesions  5. Shoulder AC arthritis  6. Shoulder loose body    OPERATION:   right  1. Shoulder arthroscopic posterior labral repair, capsulorrhaphy, 7:00 position (CPT 43822)  2. Shoulder arthroscopic SLAP type 8 repair (CPT 91160)  3. Shoulder arthroscopic extensive debridement (anterior, posterior glenohumeral joint, subacromial space) (CPT 96407)  4. Shoulder manipulation under anesthesia (CPT 66819)  5. Shoulder arthroscopic lysis of adhesions (CPT 94679)  6. Shoulder arthroscopic loose body removal  7. Shoulder arthroscopic distal clavicle excision (CPT 47003)  8. Shoulder arthroscopic subacromial decompression, bursectomy      ANESTHESIA:  General with intra-op suprascapular nerve block    BLOOD LOSS: Minimal.     DRAINS: None.     TOURNIQUET TIME: None.     COMPLICATIONS: None. The patient was moved to the recovery room in stable condition with compartments soft and cap refill less than a second in all digits.      COMPLEX PROCEDURE:  Labral repair and capsulorrhaphy was complex in nature due to the chronic nature of this case and remnant labral tissue lesion tissue and altered anatomy of this case.  There was an altered surgical field with abnormal anatomy. There was an altered surgical field due to the complex labral tear. There was abnormal anatomy. Complexity of the service was much greater than the normative procedure. There was increased time, intensity and technical difficulty of the procedure, severity of the patient's condition and mental effort required.  This was a highly  complicated repair and tear pattern that required advanced arthroscopic skill in order to safely and technically perform it. Nevertheless the repair achieved was excellent    BRIEF INDICATION OF MEDICAL NECESSITY: The patient is a 33 y.o. year-old male who has history and physical examination findings consistent with the above. Nonoperative versus operative options were discussed. The risks and benefits were discussed with the patient. The patient acknowledged understanding and wished to proceed with operative intervention. Informed consent was obtained prior to the procedure. Reasonable expectations and potential complications were discussed and acknowledged, including but not limited to infection, bleeding, blood clots, (DVT and/or PE), nerve injury, re-tear, instability, continued pain and stiffness. They agreed and   understood and wished to proceed.     EXAMINATION UNDER ANESTHESIA OF THE right SHOULDER: Forward elevation 175 degrees, External rotation at 0-60 degrees, External rotation at 90-90 degrees, Internal rotation at 90-60 degrees. Translation testing: anterior grade 2, posterior grade 1, sulcus sign grade 1 corrects to 0 on external rotation.     EXAMINATION UNDER ANESTHESIA OF THE NONOPERATED left SHOULDER: Forward elevation 175 degrees, External rotation at 0-60 degrees, External rotation at 0-90 degrees, Internal rotation at 90-60 degrees. Translation testing: anterior grade 1, posterior grade 1, sulcus sign grade 1 corrects to 0 on external rotation.      PROCEDURE IN DETAIL:  After the correct operative site was marked by the operating surgeon. The patient was then taken to the operating room and placed supine on the operating room table, where the patient  underwent general anesthesia by the anesthesia team.  The patient was then rolled into the lateral decubitus position with the operative side up.  A well-padded axillary roll, beanbag and pillows were placed.  All pressure points were carefully  padded and checked.  The upper extremities and both lower extremities were placed in comfortable positions and were also well-padded.  The operative upper extremity was then prepped and draped in the usual sterile fashion.     Suprascapular nerve block was performed in the standard fashion with 5cc local anesthetic.    The Spider arm positioner was implemented with balanced suspension and appropriate landmarks were noted on the skin. A posterior followed by nargis-superior portals were created and systematic examination of the joint revealed the following:     There was no evidence of any significant chondral lesions to the glenoid or humeral head.      There was a SLAP type 8 lesion noted to the biceps root upon probing and careful inspection.      COMPLEX PROCEDURE:  Labral repair was complex in nature due to the chronic nature of this case and remnant labral tissue lesion tissue and altered anatomy of this case.  There was an altered surgical field with abnormal anatomy.   There was an altered surgical field due to the complex labral tear. There was abnormal anatomy. Complexity of the service was much greater than the normative procedure. There was increased time, intensity and technical difficulty of the procedure, severity of the patient's condition and mental effort required.  This was a highly complicated repair and tear pattern that required advanced arthroscopic skill in order to safely and technically perform it. Nevertheless the repair achieved was excellent     Adhesions were cleared off labrum anteriorly and capsule was able to be mobilized after lysis of adhesions.     Cannulas were placed and a shaver was then used to roughen and debride the surface of the posterior glenoid neck surfaces. Then 1-2 mm of articular surface was debrided for glenoid anchor placement.      There was synovitis in the shoulder anteriorly and posteriorly and was debrided anteriorly and posteriorly in the glenohumeral joint to  the areas of concern.    An inferior transcutaneous 3-mm portal was created to place the inferior anchors. Care was taken to avoid any damage to the neurovascular structures, axillary nerve, below.      Internal impingement debridement was performed to cuff (20% and labrum post-superiorly).    2 anchors were placed in total, posteriorly at the 9:00 position and 10:00 positions.      These were 1.6 mm Arthrex FiberTak soft anchors and a simple suture was placed through the labrum and capsule, and labral repair and capsulorrhaphy was performed. Care was taken to avoid the neurovascular structures and axillary nerve bundles below. Suture was then tied using modified Naun knots with the knots from the tissue side. All knots had good loop security with repair starting posteriorly and then sequentially tensioned.     The repair recentered the humeral head nicely on the glenoid. The shoulder was stable on translation testing.     A medial as possible transcutaneous 3-mm portal was created to place the 1 superior SLAP type 8 anchors, 1 at the 1 o'clock position. This was 1.6 mm Arthrex FiberTak soft anchor and a simple knotless suture was placed with repair posteriorly.  This gave 1 suture holding the SLAP repair and the labrum was secured and was probed and found to be quite stable.      3 anchors were used in total.     This recentered the humeral head nicely on the glenoid. The shoulder was stable to translation testing.      Attention was then turned to the subacromial space where a significant hypertrophic bursa was encountered.  The bursa itself was thickened and hypertrophic.  A lateral portal was created to assist with the bursectomy.  Subacromial decompression was completed using three-portal technique in the standard fashion with a 4.5 mm aryan without difficulty.  The anterior osteophyte was flattened.  Confirmation of adequate resection was confirmed while viewing from the lateral portal.       Next, attention  was then turned to the distal clavicle excision.  A thermal device was used to clear the soft tissue of the length of the AC joint approximately 1cm medial to the end of the distal end of the clavicle.  The anterior portal which was established earlier was used to perform the AC resection at the level of the AC joint  with arthroscopic aryan.  The adequacy of the the resection was confirmed while viewing from the anterior portal.  Care was taken to resect enough postero-superiorly.       Loose bodies measuring 5 x 5 x 7 mm was removed from AC joint compartment using arthroscopic grasper (subscap recess was clear).    Local 5cc placed in to the joint along with suprascapular nerve block was performed in the standard fashion also with 5cc local anesthetic. Local was placed about portals and incision(s) after irrigation, as described below, was completed. The shoulder was then irrigated and fluid was extravasated using suction. All portals were reapproximated using inverted 4-0 Monocryl suture in the subcutaneous tissue of the portals. Mastisol and Steri-strips were placed with xeroform, 4x4s, abd pad, and Medi-pore tape.  TENS unit pads were placed which were medically necessary for pain relief.  An iceman was secured in the shoulder diamond.  A sling with an abduction pillow was secured.  The patient was then moved to supine, extubated and taken to the recovery room where the patient arrived in stable condition with the compartments of the arm and forearm soft and cap refill less than a second in all digits.     POST OPERATIVE PLAN: We will follow the arthroscopic posterior labral repair with biceps tenodesis and SLAP repair guidelines. We discussed with the patient's family after surgery. The patient will remain in a sling for 6 weeks. We will start PT at the 2 week nery.     Quality of tissue: Good    Quality of the repair: Good

## 2024-02-02 ENCOUNTER — OFFICE VISIT (OUTPATIENT)
Dept: SPORTS MEDICINE | Facility: CLINIC | Age: 34
End: 2024-02-02
Payer: OTHER GOVERNMENT

## 2024-02-02 VITALS — BODY MASS INDEX: 25.96 KG/M2 | WEIGHT: 181.31 LBS | HEIGHT: 70 IN

## 2024-02-02 DIAGNOSIS — Z98.890 STATUS POST LABRAL REPAIR OF SHOULDER: Primary | ICD-10-CM

## 2024-02-02 PROCEDURE — 99213 OFFICE O/P EST LOW 20 MIN: CPT | Mod: PBBFAC | Performed by: PHYSICIAN ASSISTANT

## 2024-02-02 PROCEDURE — 99024 POSTOP FOLLOW-UP VISIT: CPT | Mod: ,,, | Performed by: PHYSICIAN ASSISTANT

## 2024-02-02 PROCEDURE — 99999 PR PBB SHADOW E&M-EST. PATIENT-LVL III: CPT | Mod: PBBFAC,,, | Performed by: PHYSICIAN ASSISTANT

## 2024-02-02 NOTE — PROGRESS NOTES
Chief Complaint: right shoulder pain    HISTORY OF PRESENT ILLNESS:   Pt is here today for post-operative followup of shoulder arthroscopy.  he is doing well.  We have reviewed patient's findings and discussed plan of care and future treatment options.      Tolerating pain well. Only taking an occasional tramadol.   Wearing sling and pillow which is in place.  Pain at a 5/10.   No issues reported.     He reports some pain and popping around his AC joint. He did have an old AC joint injury years ago and AC joint was popping and hurting prior to surgery also. He does not feel this is worse today.        DATE OF PROCEDURE: 1/18/2024  SURGEON: Samia Villalba M.D.  OPERATION:   right  1. Shoulder arthroscopic posterior labral repair, capsulorrhaphy, 7:00 position (CPT 89993)  2. Shoulder arthroscopic SLAP type 8 repair (CPT 10998)  3. Shoulder arthroscopic extensive debridement (anterior, posterior glenohumeral joint, subacromial space) (CPT 12973)  4. Shoulder manipulation under anesthesia (CPT 30730)  5. Shoulder arthroscopic lysis of adhesions (CPT 61611)  6. Shoulder arthroscopic loose body removal  7. Shoulder arthroscopic distal clavicle excision (CPT 44933)  8. Shoulder arthroscopic subacromial decompression, bursectomy                                                                                      PHYSICAL EXAMINATION:     Incision sites healed well  No evidence of any erythema, infection or induration  elbow Range of motion full   Minimal effusion  2+ Radial pulses  No swelling                                                                               ASSESSMENT:                                                                                                                                               1. Status post above, doing well.                                                                                                                               PLAN:                                                                                                                                                      1. PT to start next week; wean narcotics  2. Emphasized scapular function.  3. I have discussed return to activity in detail.  4. Patient will see us back in 4 weeks.                                      5. Discussed case with PT.    All questions were answered, surgical technique was reviewed and interpreted, and patient should contact us with any questions or concerns in the interim.

## 2024-02-08 ENCOUNTER — CLINICAL SUPPORT (OUTPATIENT)
Dept: REHABILITATION | Facility: HOSPITAL | Age: 34
End: 2024-02-08
Payer: OTHER GOVERNMENT

## 2024-02-08 DIAGNOSIS — G89.29 CHRONIC RIGHT SHOULDER PAIN: ICD-10-CM

## 2024-02-08 DIAGNOSIS — M25.511 CHRONIC RIGHT SHOULDER PAIN: ICD-10-CM

## 2024-02-08 PROCEDURE — 97161 PT EVAL LOW COMPLEX 20 MIN: CPT

## 2024-02-08 PROCEDURE — 97110 THERAPEUTIC EXERCISES: CPT

## 2024-02-09 NOTE — PLAN OF CARE
[facility]  Physical Therapy Initial Evaluation    Name: Jasson Velasco  Clinic Number: [id number]    Therapy Diagnosis:   Encounter Diagnosis   Name Primary?    Chronic right shoulder pain      Physician: Willy Heller III, *    Physician Orders: PT Eval and Treat  Medical Diagnosis from Referral: M25.511 (ICD-10-CM) - Right shoulder pain, unspecified chronicity   Evaluation Date: 2/8/2024  Authorization Period Expiration: 5/24/2024  Plan of Care Expiration: 9/8/2024  Progress Note Due: 3/8/2024  Visit # / Visits authorized: 1/ 1      Time In: 1120  Time Out: 1150  Total Billable Time: 30 minutes    DOS: 1/18/2024  S/p: 1. Shoulder arthroscopic posterior labral repair, capsulorrhaphy, 7:00 position (CPT 88094)  2. Shoulder arthroscopic SLAP type 8 repair (CPT 89733)  3. Shoulder arthroscopic extensive debridement (anterior, posterior glenohumeral joint, subacromial space) (CPT 38462)  4. Shoulder manipulation under anesthesia (CPT 07699)  5. Shoulder arthroscopic lysis of adhesions (CPT 19282)  6. Shoulder arthroscopic loose body removal  7. Shoulder arthroscopic distal clavicle excision (CPT 63087)  8. Shoulder arthroscopic subacromial decompression, bursectomy   Post-Op Precautions: We will follow the arthroscopic posterior labral repair with biceps tenodesis and SLAP repair guidelines. We discussed with the patient's family after surgery. The patient will remain in a sling for 6 weeks. We will start PT at the 2 week nery.       Precautions: Standard    Subjective     Date of onset: surgery 1/18/2024  History of current condition - Jasson reports: he was reaching up for a branch with his 5 year old sone at the park and felt something rip in his shoulder. Notes a history of chronic R shoulder pain from an injury when he was 19 and hurt his AC joint causing some instability at that area. The patient reports that he underwent surgery due to sig inability to complete ADLs and work related  activities. He currently has stiffness and AC joint pain that was not present before. Notes his neck is sore from the sling but overall doing OK. Denies any N+T. The patient is an active in the  and needs to be able to do 23 dead hang pull-ups, 4 min plank, weighted OH press and an 18 min 3 mile run. The patient notes he was able to complete more than that pre- surgical and needs to get back to his prior level of function before he is cleared to return to active duty.      Imaging none since surgery    Prior Therapy: none for shoulder  Exercise Routine/Sport Participation: crossfit style workouts, active duty, active   Social History: lives at home   Occupation:  active duty   Prior Level of Function: unrestricted with strength prior to injury   Current Level of Function: sig post op restrictions     Pain:  Current 2/10, worst 4/10, best 2/10   Location: right shoulder  Description: Aching, Dull, and Tight  Aggravating Factors: Night Time, Morning, Lifting, and Getting out of bed/chair  Easing Factors: rest    Pts goals: return to active duty      Medical History:   Past Medical History:   Diagnosis Date    Biceps tendonitis on right 01/18/2024    SLAP lesion of right shoulder 01/18/2024       Surgical History:   Jasson Velasco  has a past surgical history that includes Colonoscopy (N/A, 5/25/2023); Arthroscopic debridement of shoulder (Right, 1/18/2024); Distal clavicle excision (Right, 1/18/2024); Shoulder arthroscopy w/ superior labral anterior posterior lesion repair (Right, 1/18/2024); repair (Right, 1/18/2024); and Arthroscopy of shoulder with decompression of subacromial space (Right, 1/18/2024).    Medications:   Jasson has a current medication list which includes the following prescription(s): albuterol, aspirin, benzonatate, bupropion, bupropion, cetirizine, fluticasone propionate, meloxicam, meloxicam, oxycodone-acetaminophen, promethazine, tramadol, and venlafaxine.    Allergies:   Review of  patient's allergies indicates:  No Known Allergies     Objective     Observation: pt in no apparent distress, in gun slinger arm sling R in good position     Posture: unremarkable        Shoulder Passive Range of Motion within Protcol Limits:    Right Left   Flexion   60 180   ER at 0   NT 90   ER at 90   NT 90   IR   NT 90     Elbow Active Range of Motion within Protcol Limits   Right Left   Flexion   135 135   Extension   0 0   Supination 90 90   Pronation 90 90     Cervical Active Range of Motion: WNL     Wrist Active Range of Motion : WNL       Palpation: ttp at AC joitnt      Sensation: Intact      Pulses: Intact       Special Tests: Not performed today due to post-op status     Strength: Not assessed today due to post-op status.     Joint Mobility: Not assessed today due to post-op status.        CMS Impairment/Limitation/Restriction for FOTO DASH Survey    Therapist reviewed FOTO scores for Jasson Velasco on 2/8/2024.   FOTO documents entered into [employer] - see Media section.    Limitation Score: see media%  Category: Mobility       Treatment     Treatment Time In: 1140  Treatment Time Out: 1150  Total Treatment time separate from Evaluation: 10 minutes    Jasson received the following:     Therapeutic exercises to develop ROM for 10 minutes including:  Table slides with L hand assistance   Scap squeeze   Pendulums     Next visit: Upright bike in sling   Forearm strengthening exercises       Home Exercises and Patient Education Provided     Education provided:   - Sling wear and lifting restrictions were reviewed  - Prognosis, activity modification, goals for therapy, role of therapy for care, exercises/HEP    Written Home Exercises Provided: yes.  Exercises were reviewed and Jasson was able to demonstrate them prior to the end of the session.   Pt received a written copy of exercises to perform at home. Jasson demonstrated good  understanding of the education provided.     See EMR under patient instructions for exercises  given.     Assessment     Jasson is a 34 y.o. male referred to outpatient Physical Therapy s/p SLAP repair on R Shoulder. The patient demonstrates normal post operative restrictions in shoulder AROM/PROM, underlying strength deficits, pain, difficulty sleeping and decreased ability to independently complete ADLs and IADLs decreasing quality. Pt will benefit from skilled outpatient Physical Therapy to address the deficits stated above and in the chart below, provide pt/family education, and to maximize pt's level of independence. Pt prognosis is Good.     Plan of care discussed with patient: Yes  Pt's spiritual, cultural and educational needs considered and patient is agreeable to the plan of care and goals as stated below:       Anticipated Barriers for therapy: none    Medical Necessity is demonstrated by the following  History  Co-morbidities and personal factors that may impact the plan of care [] LOW: no personal factors / co-morbidities  [x] MODERATE: 1-2 personal factors / co-morbidities  [] HIGH: 3+ personal factors / co-morbidities    Moderate / High Support Documentation:   Co-morbidities affecting plan of care: AC Joint injury in the past     Personal Factors:   no deficits     Examination  Body Structures and Functions, activity limitations and participation restrictions that may impact the plan of care [x] LOW: addressing 1-2 elements  [] MODERATE: 3+ elements  [] HIGH: 4+ elements (please support below)    Moderate / High Support Documentation:      Clinical Presentation [x] LOW: stable  [] MODERATE: Evolving  [] HIGH: Unstable     Decision Making/ Complexity Score: low       Goals:  Short Term Goals: 6 weeks  1. Pt will be compliant with HEP 50% of prescribed amount.   2. The pt to demo improvement in R shoulder PROM to by 80% of the L  3.  The pt to demo tolerance to being out of the sling for 24 hours with pain <2/10     Long Term Goals: 24 weeks   Pt will be compliant with % of prescribed  amount.   The pt to improvement in AROM of R shoulder within 80% of L shoulder to improve tolerance to OH movement   The pt to  Demo at least 4+/5 of RTC muscle testing to demo improvement in tolerance to activity  The pt to tolerate lifting 50# overhead to improve tolerance to ADLs and work related activities   The pt will report full participation in ADLs and IADLs without restrictions related to R Shoulder.      Plan   Plan of care Certification: 2/8/2024 to 9/8/2024.    Outpatient Physical Therapy 2 times weekly for 7 months to include the following interventions: Manual Therapy, Moist Heat/ Ice, Neuromuscular Re-ed, Patient Education, Therapeutic Activites, and Therapeutic Exercise.     Supriya Mesa, PT , DPT, SCS, FAAMOMPT

## 2024-02-15 ENCOUNTER — CLINICAL SUPPORT (OUTPATIENT)
Dept: REHABILITATION | Facility: HOSPITAL | Age: 34
End: 2024-02-15
Payer: OTHER GOVERNMENT

## 2024-02-15 DIAGNOSIS — M25.511 ACUTE PAIN OF RIGHT SHOULDER: ICD-10-CM

## 2024-02-15 DIAGNOSIS — M25.611 DECREASED RANGE OF MOTION OF RIGHT SHOULDER: Primary | ICD-10-CM

## 2024-02-15 PROCEDURE — 97530 THERAPEUTIC ACTIVITIES: CPT

## 2024-02-15 PROCEDURE — 97140 MANUAL THERAPY 1/> REGIONS: CPT

## 2024-02-15 PROCEDURE — 97112 NEUROMUSCULAR REEDUCATION: CPT

## 2024-02-15 NOTE — PROGRESS NOTES
[facility]   Physical Therapy Treatment Note     Name: Jasson Velasco  [facility] Number: [id number]    Therapy Diagnosis:   Encounter Diagnoses   Name Primary?    Decreased range of motion of right shoulder Yes    Acute pain of right shoulder      Physician: Willy Heller III, *    Visit Date: 2/15/2024    Physician Orders: PT Eval and Treat  Medical Diagnosis from Referral: M25.511 (ICD-10-CM) - Right shoulder pain, unspecified chronicity   Evaluation Date: 2/8/2024  Authorization Period Expiration: 5/24/2024  Plan of Care Expiration: 9/8/2024  Progress Note Due: 3/8/2024  Visit # / Visits authorized: 1/ 15        Time In: 1100  Time Out: 1150  Total Billable Time: 30 minutes     DOS: 1/18/2024  S/p: 1. Shoulder arthroscopic posterior labral repair, capsulorrhaphy, 7:00 position (CPT 30291)  2. Shoulder arthroscopic SLAP type 8 repair (CPT 08767)  3. Shoulder arthroscopic extensive debridement (anterior, posterior glenohumeral joint, subacromial space) (CPT 46630)  4. Shoulder manipulation under anesthesia (CPT 30775)  5. Shoulder arthroscopic lysis of adhesions (CPT 51487)  6. Shoulder arthroscopic loose body removal  7. Shoulder arthroscopic distal clavicle excision (CPT 98227)  8. Shoulder arthroscopic subacromial decompression, bursectomy   Post-Op Precautions: We will follow the arthroscopic posterior labral repair with biceps tenodesis and SLAP repair guidelines. We discussed with the patient's family after surgery. The patient will remain in a sling for 6 weeks. We will start PT at the 2 week nery.         Precautions: Standard      SUBJECTIVE     Pt reports: he feels like he has less motion, felt really good after IE, AC joint cont to be irritable.    He was compliant with home exercise program.  Response to previous treatment: improvement in resting pain in shoulder joint   Functional change: na     Pain: 3/10  Location: right shoulder      OBJECTIVE     PROM R Shoulder:  Shoulder  Flexion: 90   Shoulder ER: 10 deg     Treatment       Jasson received the treatments listed below:      Manual therapy techniques: Joint mobilizations were applied to the: R shoulder for 8 minutes, including:  Skilled PROM into shoulder flexion, abduction, ER per post-operative guidelines to improve range of motion and mobility at the glenohumeral joint and prevent contracture.      Therapeutic activities to improve functional performance for 15  minutes, including:  Bike x15m to improve cardiovascular endurance, pain levels    Neuromuscular re-education activities to improve: Balance and Posture for 23 minutes. The following activities were included:  Scap squeeze 20s 5s holds   Scap elevation 20x   Wrist Ext 1# 3x15   Table slides with A of other hand 20x            Patient Education and Home Exercises     Home Exercises Provided and Patient Education Provided     Education provided:   - cont to wear sling     Written Home Exercises Provided: Patient instructed to cont prior HEP. Exercises were reviewed and Jasson was able to demonstrate them prior to the end of the session.  Jasson demonstrated good  understanding of the education provided. See EMR under Patient Instructions for exercises provided during therapy sessions    ASSESSMENT     The patient tolerated all therex well with no increase in shoulder pain, reported fatigue following. Cont to prog as abi.     Jasson Is progressing well towards his goals.     Pt prognosis is Good.     Pt will continue to benefit from skilled outpatient physical therapy to address the deficits listed in the problem list box on initial evaluation, provide pt/family education and to maximize pt's level of independence in the home and community environment.     Pt's spiritual, cultural and educational needs considered and pt agreeable to plan of care and goals.     Anticipated barriers to physical therapy: AC Joint Injury the past    Goals:   Short Term Goals: 6 weeks  1. Pt will be  compliant with HEP 50% of prescribed amount.   2. The pt to demo improvement in R shoulder PROM to by 80% of the L  3.  The pt to demo tolerance to being out of the sling for 24 hours with pain <2/10     Long Term Goals: 24 weeks   Pt will be compliant with % of prescribed amount.   The pt to improvement in AROM of R shoulder within 80% of L shoulder to improve tolerance to OH movement   The pt to  Demo at least 4+/5 of RTC muscle testing to demo improvement in tolerance to activity  The pt to tolerate lifting 50# overhead to improve tolerance to ADLs and work related activities   The pt will report full participation in ADLs and IADLs without restrictions related to R Shoulder.      PLAN     Follow SLAP Repair Guidelines    Supriya Mesa, PT PT, DPT, SCS, FAAOMPT

## 2024-02-22 ENCOUNTER — CLINICAL SUPPORT (OUTPATIENT)
Dept: REHABILITATION | Facility: HOSPITAL | Age: 34
End: 2024-02-22
Payer: OTHER GOVERNMENT

## 2024-02-22 DIAGNOSIS — M25.511 ACUTE PAIN OF RIGHT SHOULDER: Primary | ICD-10-CM

## 2024-02-22 DIAGNOSIS — M25.611 DECREASED RANGE OF MOTION OF RIGHT SHOULDER: ICD-10-CM

## 2024-02-22 PROCEDURE — 97140 MANUAL THERAPY 1/> REGIONS: CPT

## 2024-02-22 PROCEDURE — 97530 THERAPEUTIC ACTIVITIES: CPT

## 2024-02-22 PROCEDURE — 97112 NEUROMUSCULAR REEDUCATION: CPT

## 2024-02-23 NOTE — PROGRESS NOTES
[facility]   Physical Therapy Treatment Note     Name: Jasson Velasco  [facility] Number: [id number]    Therapy Diagnosis:   Encounter Diagnoses   Name Primary?    Acute pain of right shoulder Yes    Decreased range of motion of right shoulder      Physician: Willy Heller III, *    Visit Date: 2/22/2024    Physician Orders: PT Eval and Treat  Medical Diagnosis from Referral: M25.511 (ICD-10-CM) - Right shoulder pain, unspecified chronicity   Evaluation Date: 2/8/2024  Authorization Period Expiration: 5/24/2024  Plan of Care Expiration: 9/8/2024  Progress Note Due: 3/8/2024  Visit # / Visits authorized: 2/ 15        Time In: 1100  Time Out: 1150  Total Billable Time: 30 minutes     DOS: 1/18/2024  S/p: 1. Shoulder arthroscopic posterior labral repair, capsulorrhaphy, 7:00 position (CPT 37412)  2. Shoulder arthroscopic SLAP type 8 repair (CPT 35337)  3. Shoulder arthroscopic extensive debridement (anterior, posterior glenohumeral joint, subacromial space) (CPT 92780)  4. Shoulder manipulation under anesthesia (CPT 81570)  5. Shoulder arthroscopic lysis of adhesions (CPT 89295)  6. Shoulder arthroscopic loose body removal  7. Shoulder arthroscopic distal clavicle excision (CPT 77219)  8. Shoulder arthroscopic subacromial decompression, bursectomy   Post-Op Precautions: We will follow the arthroscopic posterior labral repair with biceps tenodesis and SLAP repair guidelines. We discussed with the patient's family after surgery. The patient will remain in a sling for 6 weeks. We will start PT at the 2 week nery.         Precautions: Standard      SUBJECTIVE     Pt reports: AC joint is the most irritable, otherwise doing OK    He was compliant with home exercise program.  Response to previous treatment: improvement in resting pain in shoulder joint   Functional change: na     Pain: 3/10  Location: right shoulder      OBJECTIVE     PROM R Shoulder:  Shoulder Flexion: 90   Shoulder ER: 10 deg      Treatment       Jasson received the treatments listed below:      Manual therapy techniques: Joint mobilizations were applied to the: R shoulder for 08 minutes, including:  Skilled PROM into shoulder flexion, abduction, ER per post-operative guidelines to improve range of motion and mobility at the glenohumeral joint and prevent contracture.      Therapeutic activities to improve functional performance for 15  minutes, including:  Bike x15m to improve cardiovascular endurance, pain levels    Neuromuscular re-education activities to improve: Balance and Posture for 23 minutes. The following activities were included:  Scap squeeze 20s 5s holds   Scap elevation 20x   Wrist Ext 1# 3x15   Wrist sup/pro 1#/2# 3x10 ea  Table slides with A of other hand 20x            Patient Education and Home Exercises     Home Exercises Provided and Patient Education Provided     Education provided:   - cont to wear sling     Written Home Exercises Provided: Patient instructed to cont prior HEP. Exercises were reviewed and Jasson was able to demonstrate them prior to the end of the session.  Jasson demonstrated good  understanding of the education provided. See EMR under Patient Instructions for exercises provided during therapy sessions    ASSESSMENT     Cont to abi all PROM well with less pain at end ranges. ROM not pushed per protocol. Abi therex well.     Jasson Is progressing well towards his goals.     Pt prognosis is Good.     Pt will continue to benefit from skilled outpatient physical therapy to address the deficits listed in the problem list box on initial evaluation, provide pt/family education and to maximize pt's level of independence in the home and community environment.     Pt's spiritual, cultural and educational needs considered and pt agreeable to plan of care and goals.     Anticipated barriers to physical therapy: AC Joint Injury the past    Goals:   Short Term Goals: 6 weeks  1. Pt will be compliant with HEP 50% of  prescribed amount.   2. The pt to demo improvement in R shoulder PROM to by 80% of the L  3.  The pt to demo tolerance to being out of the sling for 24 hours with pain <2/10     Long Term Goals: 24 weeks   Pt will be compliant with % of prescribed amount.   The pt to improvement in AROM of R shoulder within 80% of L shoulder to improve tolerance to OH movement   The pt to  Demo at least 4+/5 of RTC muscle testing to demo improvement in tolerance to activity  The pt to tolerate lifting 50# overhead to improve tolerance to ADLs and work related activities   The pt will report full participation in ADLs and IADLs without restrictions related to R Shoulder.      PLAN     Follow SLAP Repair Guidelines    Supriya Mesa, PT PT, DPT, SCS, FAAOMPT

## 2024-02-27 ENCOUNTER — TELEPHONE (OUTPATIENT)
Dept: SPORTS MEDICINE | Facility: CLINIC | Age: 34
End: 2024-02-27
Payer: OTHER GOVERNMENT

## 2024-02-27 NOTE — TELEPHONE ENCOUNTER
----- Message from Olga Sena MA sent at 2/26/2024  4:46 PM CST -----  Regarding: FW: katharine  Contact: [phone]    ----- Message -----  From: Hiral Collins  Sent: 2/26/2024   3:52 PM CST  To: Olga Sena MA  Subject: FW: katharine                                            ----- Message -----  From: Lydia Escobar  Sent: 2/26/2024   3:52 PM CST  To: Orly Gracia Staff  Subject: apt                                              Pt calling in to move time to 9am instead of 2pm on 2/28/24 please call to discuss Further

## 2024-02-27 NOTE — TELEPHONE ENCOUNTER
Lvm informing pt that appt time has been moved to 9am on 2/28/24. Pt requested to move appt time from 2pm to 9am on 2/.28/24.

## 2024-02-28 ENCOUNTER — OFFICE VISIT (OUTPATIENT)
Dept: SPORTS MEDICINE | Facility: CLINIC | Age: 34
End: 2024-02-28
Payer: OTHER GOVERNMENT

## 2024-02-28 ENCOUNTER — CLINICAL SUPPORT (OUTPATIENT)
Dept: REHABILITATION | Facility: HOSPITAL | Age: 34
End: 2024-02-28
Payer: OTHER GOVERNMENT

## 2024-02-28 VITALS
HEART RATE: 84 BPM | WEIGHT: 179 LBS | BODY MASS INDEX: 25.62 KG/M2 | DIASTOLIC BLOOD PRESSURE: 81 MMHG | SYSTOLIC BLOOD PRESSURE: 117 MMHG | HEIGHT: 70 IN

## 2024-02-28 DIAGNOSIS — M25.511 ACUTE PAIN OF RIGHT SHOULDER: Primary | ICD-10-CM

## 2024-02-28 DIAGNOSIS — Z98.890 STATUS POST LABRAL REPAIR OF SHOULDER: Primary | ICD-10-CM

## 2024-02-28 DIAGNOSIS — M25.611 DECREASED RANGE OF MOTION OF RIGHT SHOULDER: ICD-10-CM

## 2024-02-28 PROCEDURE — 97140 MANUAL THERAPY 1/> REGIONS: CPT | Mod: CQ

## 2024-02-28 PROCEDURE — 97530 THERAPEUTIC ACTIVITIES: CPT | Mod: CQ

## 2024-02-28 PROCEDURE — 97112 NEUROMUSCULAR REEDUCATION: CPT | Mod: CQ

## 2024-02-28 PROCEDURE — 99213 OFFICE O/P EST LOW 20 MIN: CPT | Mod: PBBFAC | Performed by: ORTHOPAEDIC SURGERY

## 2024-02-28 PROCEDURE — 99024 POSTOP FOLLOW-UP VISIT: CPT | Mod: ,,, | Performed by: ORTHOPAEDIC SURGERY

## 2024-02-28 PROCEDURE — 99999 PR PBB SHADOW E&M-EST. PATIENT-LVL III: CPT | Mod: PBBFAC,,, | Performed by: ORTHOPAEDIC SURGERY

## 2024-02-28 NOTE — PROGRESS NOTES
[facility]   Physical Therapy Treatment Note     Name: Jasson Velasco  [facility] Number: [id number]    Therapy Diagnosis:   Encounter Diagnoses   Name Primary?    Acute pain of right shoulder Yes    Decreased range of motion of right shoulder      Physician: Willy Heller III, *    Visit Date: 2/28/2024    Physician Orders: PT Eval and Treat  Medical Diagnosis from Referral: M25.511 (ICD-10-CM) - Right shoulder pain, unspecified chronicity   Evaluation Date: 2/8/2024  Authorization Period Expiration: 5/24/2024  Plan of Care Expiration: 9/8/2024  Progress Note Due: 3/8/2024  Visit # / Visits authorized: 3/ 15        Time In: 10:05  Time Out: 11:00  Total Billable Time: 55 minutes     DOS: 1/18/2024  S/p: 1. Shoulder arthroscopic posterior labral repair, capsulorrhaphy, 7:00 position (CPT 63859)  2. Shoulder arthroscopic SLAP type 8 repair (CPT 11122)  3. Shoulder arthroscopic extensive debridement (anterior, posterior glenohumeral joint, subacromial space) (CPT 28433)  4. Shoulder manipulation under anesthesia (CPT 07252)  5. Shoulder arthroscopic lysis of adhesions (CPT 87859)  6. Shoulder arthroscopic loose body removal  7. Shoulder arthroscopic distal clavicle excision (CPT 01691)  8. Shoulder arthroscopic subacromial decompression, bursectomy   Post-Op Precautions: We will follow the arthroscopic posterior labral repair with biceps tenodesis and SLAP repair guidelines. We discussed with the patient's family after surgery. The patient will remain in a sling for 6 weeks. We will start PT at the 2 week nery.         Precautions: Standard      SUBJECTIVE     Pt reports: MD D/C samara. Told me I know my limitations out of sling    He was compliant with home exercise program.  Response to previous treatment: improvement in resting pain in shoulder joint   Functional change: na     Pain: 3/10  Location: right shoulder      OBJECTIVE     PROM R Shoulder:  Shoulder Flexion: 90   Shoulder ER: 10 deg      Treatment       Jasson received the treatments listed below:      Manual therapy techniques: Joint mobilizations were applied to the: R shoulder for 08 minutes, including:  Skilled PROM into shoulder flexion, abduction, ER per post-operative guidelines to improve range of motion and mobility at the glenohumeral joint and prevent contracture.    Grade II GH joint inferior mobs    Therapeutic activities to improve functional performance for 15  minutes, including:  Bike x10m to improve cardiovascular endurance, pain levels  Walking with arm out of sling 10 laps    Neuromuscular re-education activities to improve: Balance and Posture for 32 minutes. The following activities were included:  Pulley scaption x 5 min  Table slides with A of other hand 20x    Pulley scapiton x 5 min  Bent over scap retraction 3x10      Patient Education and Home Exercises     Home Exercises Provided and Patient Education Provided     Education provided:   - cont to wear sling     Written Home Exercises Provided: Patient instructed to cont prior HEP. Exercises were reviewed and Jasson was able to demonstrate them prior to the end of the session.  Jasson demonstrated good  understanding of the education provided. See EMR under Patient Instructions for exercises provided during therapy sessions    ASSESSMENT     Pt is 5 wks 6 days today and just came form his 6 wk follow up with Dr. Villalba. Pt is to wean out of sling and we went over out of sling precautions. Able to passively flex shoulder past 90 without pain. More restricted IR than ER. Added pulley to work on AAROM in scapiton. Good scapular neuromuscular control with bent over rows. Pt noted shoulder was sore after walking 10 laps on turf. We talked about weaning form sling vs fully D/C.    Jasson Is progressing well towards his goals.     Pt prognosis is Good.     Pt will continue to benefit from skilled outpatient physical therapy to address the deficits listed in the problem list box on  initial evaluation, provide pt/family education and to maximize pt's level of independence in the home and community environment.     Pt's spiritual, cultural and educational needs considered and pt agreeable to plan of care and goals.     Anticipated barriers to physical therapy: AC Joint Injury the past    Goals:   Short Term Goals: 6 weeks  1. Pt will be compliant with HEP 50% of prescribed amount.   2. The pt to demo improvement in R shoulder PROM to by 80% of the L  3.  The pt to demo tolerance to being out of the sling for 24 hours with pain <2/10     Long Term Goals: 24 weeks   Pt will be compliant with % of prescribed amount.   The pt to improvement in AROM of R shoulder within 80% of L shoulder to improve tolerance to OH movement   The pt to  Demo at least 4+/5 of RTC muscle testing to demo improvement in tolerance to activity  The pt to tolerate lifting 50# overhead to improve tolerance to ADLs and work related activities   The pt will report full participation in ADLs and IADLs without restrictions related to R Shoulder.      PLAN     Follow SLAP Repair Guidelines    Jailene Paige, PTA

## 2024-02-28 NOTE — PROGRESS NOTES
Chief Complaint: right shoulder pain    HISTORY OF PRESENT ILLNESS:   Pt is here today for post-operative followup of shoulder arthroscopy.  he is doing well.  We have reviewed patient's findings and discussed plan of care and future treatment options.      Tolerating pain well. Only taking an occasional tramadol.   Wearing sling and pillow which is in place.  Pain at a 0/10.   No issues reported.     He reports some pain and popping around his AC joint. He did have an old AC joint injury years ago and AC joint was popping and hurting prior to surgery also. He does not feel this is worse today.        DATE OF PROCEDURE: 1/18/2024  SURGEON: Samia Villalba M.D.  OPERATION:   right  1. Shoulder arthroscopic posterior labral repair, capsulorrhaphy, 7:00 position (CPT 95248)  2. Shoulder arthroscopic SLAP type 8 repair (CPT 92785)  3. Shoulder arthroscopic extensive debridement (anterior, posterior glenohumeral joint, subacromial space) (CPT 53050)  4. Shoulder manipulation under anesthesia (CPT 82471)  5. Shoulder arthroscopic lysis of adhesions (CPT 65383)  6. Shoulder arthroscopic loose body removal  7. Shoulder arthroscopic distal clavicle excision (CPT 87333)  8. Shoulder arthroscopic subacromial decompression, bursectomy                                                                                   3 anchors were used in total.            PHYSICAL EXAMINATION:     Incision sites healed well  No evidence of any erythema, infection or induration  elbow Range of motion full   Minimal effusion  2+ Radial pulses  No swelling  FE 70  ER 0 20  IR body  scaption 5/5 at 0 and 30                                                                                 ASSESSMENT:                                                                                                                                               1. Status post above, doing well.                                                                                                                                PLAN:                                                                                                                                                     1. PT to start next week; wean narcotics  2. Emphasized scapular function.  3. I have discussed return to activity in detail.  4. Patient will see us back in 6 weeks.                                      5. Discussed case with PT.    All questions were answered, surgical technique was reviewed and interpreted, and patient should contact us with any questions or concerns in the interim.                 Hopefully Ac joint settles down with PT

## 2024-03-07 ENCOUNTER — CLINICAL SUPPORT (OUTPATIENT)
Dept: REHABILITATION | Facility: HOSPITAL | Age: 34
End: 2024-03-07
Payer: OTHER GOVERNMENT

## 2024-03-07 DIAGNOSIS — M25.611 DECREASED RANGE OF MOTION OF RIGHT SHOULDER: ICD-10-CM

## 2024-03-07 DIAGNOSIS — M25.511 ACUTE PAIN OF RIGHT SHOULDER: Primary | ICD-10-CM

## 2024-03-07 PROCEDURE — 97112 NEUROMUSCULAR REEDUCATION: CPT

## 2024-03-07 PROCEDURE — 97530 THERAPEUTIC ACTIVITIES: CPT

## 2024-03-07 PROCEDURE — 97140 MANUAL THERAPY 1/> REGIONS: CPT

## 2024-03-07 NOTE — PROGRESS NOTES
[facility]   Physical Therapy Treatment Note     Name: Jasson Velasco  [facility] Number: [id number]    Therapy Diagnosis:   Encounter Diagnoses   Name Primary?    Acute pain of right shoulder Yes    Decreased range of motion of right shoulder      Physician: Willy Heller III, *    Visit Date: 3/7/2024    Physician Orders: PT Eval and Treat  Medical Diagnosis from Referral: M25.511 (ICD-10-CM) - Right shoulder pain, unspecified chronicity   Evaluation Date: 2/8/2024  Authorization Period Expiration: 5/24/2024  Plan of Care Expiration: 9/8/2024  Progress Note Due: 3/8/2024  Visit # / Visits authorized: 4/ 15        Time In: 1100  Time Out: 1150  Total Billable Time: 45 minutes     DOS: 1/18/2024  S/p: 1. Shoulder arthroscopic posterior labral repair, capsulorrhaphy, 7:00 position (CPT 19895)  2. Shoulder arthroscopic SLAP type 8 repair (CPT 64735)  3. Shoulder arthroscopic extensive debridement (anterior, posterior glenohumeral joint, subacromial space) (CPT 08221)  4. Shoulder manipulation under anesthesia (CPT 46719)  5. Shoulder arthroscopic lysis of adhesions (CPT 28808)  6. Shoulder arthroscopic loose body removal  7. Shoulder arthroscopic distal clavicle excision (CPT 74154)  8. Shoulder arthroscopic subacromial decompression, bursectomy   Post-Op Precautions: We will follow the arthroscopic posterior labral repair with biceps tenodesis and SLAP repair guidelines. We discussed with the patient's family after surgery. The patient will remain in a sling for 6 weeks. We will start PT at the 2 week nery.         Precautions: Standard      SUBJECTIVE     Pt reports: he is feeling OK, just has a lot of top of the shoulder pain at AC joint     He was compliant with home exercise program.  Response to previous treatment: improvement in resting pain in shoulder joint   Functional change: na     Pain: 3/10  Location: right shoulder      OBJECTIVE     PROM R Shoulder:  Shoulder Flexion:  100  Shoulder ER: 30 deg     AROM Shoulder   Flexion 80   ER not tested     Treatment       Jasson received the treatments listed below:      Manual therapy techniques: Joint mobilizations were applied to the: R shoulder for 08 minutes, including:  Skilled PROM into shoulder flexion, abduction, ER per post-operative guidelines to improve range of motion and mobility at the glenohumeral joint and prevent contracture.    Grade II GH joint inferior mobs    Therapeutic activities to improve functional performance for 10  minutes, including:  Landmine dowel 4x10     Neuromuscular re-education activities to improve: Balance and Posture for 28 minutes. The following activities were included:  Pulley x 5 mi  UT level 1 on wall 3x15   Wall slides with other arm assistance 3x10         Patient Education and Home Exercises     Home Exercises Provided and Patient Education Provided     Education provided:   - cont to wear sling     Written Home Exercises Provided: Patient instructed to cont prior HEP. Exercises were reviewed and Jasson was able to demonstrate them prior to the end of the session.  Jasson demonstrated good  understanding of the education provided. See EMR under Patient Instructions for exercises provided during therapy sessions    ASSESSMENT     The patient with less pinching in sup shoulder with movement with mild shld elevation to start and so added in UT activaiton exercise to improve traction and depressed shoulder position.     Jasson Is progressing well towards his goals.     Pt prognosis is Good.     Pt will continue to benefit from skilled outpatient physical therapy to address the deficits listed in the problem list box on initial evaluation, provide pt/family education and to maximize pt's level of independence in the home and community environment.     Pt's spiritual, cultural and educational needs considered and pt agreeable to plan of care and goals.     Anticipated barriers to physical therapy: AC Joint  Injury the past    Goals:   Short Term Goals: 6 weeks  1. Pt will be compliant with HEP 50% of prescribed amount.   2. The pt to demo improvement in R shoulder PROM to by 80% of the L  3.  The pt to demo tolerance to being out of the sling for 24 hours with pain <2/10     Long Term Goals: 24 weeks   Pt will be compliant with % of prescribed amount.   The pt to improvement in AROM of R shoulder within 80% of L shoulder to improve tolerance to OH movement   The pt to  Demo at least 4+/5 of RTC muscle testing to demo improvement in tolerance to activity  The pt to tolerate lifting 50# overhead to improve tolerance to ADLs and work related activities   The pt will report full participation in ADLs and IADLs without restrictions related to R Shoulder.      PLAN     Follow SLAP Repair Guidelines    Supriya Mesa, PT

## 2024-03-14 ENCOUNTER — CLINICAL SUPPORT (OUTPATIENT)
Dept: REHABILITATION | Facility: HOSPITAL | Age: 34
End: 2024-03-14
Payer: OTHER GOVERNMENT

## 2024-03-14 DIAGNOSIS — M25.511 ACUTE PAIN OF RIGHT SHOULDER: Primary | ICD-10-CM

## 2024-03-14 DIAGNOSIS — M25.611 DECREASED RANGE OF MOTION OF RIGHT SHOULDER: ICD-10-CM

## 2024-03-14 PROCEDURE — 97112 NEUROMUSCULAR REEDUCATION: CPT | Mod: CQ

## 2024-03-14 PROCEDURE — 97140 MANUAL THERAPY 1/> REGIONS: CPT | Mod: CQ

## 2024-03-14 PROCEDURE — 97530 THERAPEUTIC ACTIVITIES: CPT | Mod: CQ

## 2024-03-14 NOTE — PROGRESS NOTES
[facility]   Physical Therapy Treatment Note     Name: Jasson Velasco  [facility] Number: [id number]    Therapy Diagnosis:   Encounter Diagnoses   Name Primary?    Acute pain of right shoulder Yes    Decreased range of motion of right shoulder      Physician: Willy Heller III, *    Visit Date: 3/14/2024    Physician Orders: PT Eval and Treat  Medical Diagnosis from Referral: M25.511 (ICD-10-CM) - Right shoulder pain, unspecified chronicity   Evaluation Date: 2/8/2024  Authorization Period Expiration: 5/24/2024  Plan of Care Expiration: 9/8/2024  Progress Note Due: 3/8/2024  Visit # / Visits authorized: 5/ 15        Time In: 11:05  Time Out: 11:56  Total Billable Time: 51 minutes     DOS: 1/18/2024  S/p: 1. Shoulder arthroscopic posterior labral repair, capsulorrhaphy, 7:00 position (CPT 21385)  2. Shoulder arthroscopic SLAP type 8 repair (CPT 98341)  3. Shoulder arthroscopic extensive debridement (anterior, posterior glenohumeral joint, subacromial space) (CPT 80597)  4. Shoulder manipulation under anesthesia (CPT 79633)  5. Shoulder arthroscopic lysis of adhesions (CPT 79165)  6. Shoulder arthroscopic loose body removal  7. Shoulder arthroscopic distal clavicle excision (CPT 92835)  8. Shoulder arthroscopic subacromial decompression, bursectomy   Post-Op Precautions: We will follow the arthroscopic posterior labral repair with biceps tenodesis and SLAP repair guidelines. We discussed with the patient's family after surgery. The patient will remain in a sling for 6 weeks. We will start PT at the 2 week nery.         Precautions: Standard      SUBJECTIVE     Pt reports:shoulder is feeling better    He was compliant with home exercise program.  Response to previous treatment: improvement in resting pain in shoulder joint   Functional change: na     Pain: 3/10  Location: right shoulder      OBJECTIVE     PROM R Shoulder:  Shoulder Flexion: 100  Shoulder ER: 30 deg     AROM Shoulder   Flexion 80    ER not tested     Treatment       Jasson received the treatments listed below:      Manual therapy techniques: Joint mobilizations were applied to the: R shoulder for 10 minutes, including:  Skilled PROM into shoulder flexion, abduction, ER per post-operative guidelines to improve range of motion and mobility at the glenohumeral joint and prevent contracture.    Grade II GH joint inferior mobs    Therapeutic activities to improve functional performance for 10  minutes, including:  Landmine dowel 4x10     Neuromuscular re-education activities to improve: Balance and Posture for 31 minutes. The following activities were included:  Pulley x 5 min  UT level 1 on wall 3x15   Wall slides with other arm assistance 3x10   IR/ER walkouts OTB/YTB      Patient Education and Home Exercises     Home Exercises Provided and Patient Education Provided     Education provided:   - cont to wear sling     Written Home Exercises Provided: Patient instructed to cont prior HEP. Exercises were reviewed and Jasson was able to demonstrate them prior to the end of the session.  Jasson demonstrated good  understanding of the education provided. See EMR under Patient Instructions for exercises provided during therapy sessions    ASSESSMENT     Reported some decreased pain in AC joint. ROM is progressing well. Posterior GH mobs provide relief to shoulder. Introduced RC walkouts to work on RC strengthening with good tolerance.     Jasson Is progressing well towards his goals.     Pt prognosis is Good.     Pt will continue to benefit from skilled outpatient physical therapy to address the deficits listed in the problem list box on initial evaluation, provide pt/family education and to maximize pt's level of independence in the home and community environment.     Pt's spiritual, cultural and educational needs considered and pt agreeable to plan of care and goals.     Anticipated barriers to physical therapy: AC Joint Injury the past    Goals:   Short  Term Goals: 6 weeks  1. Pt will be compliant with HEP 50% of prescribed amount.   2. The pt to demo improvement in R shoulder PROM to by 80% of the L  3.  The pt to demo tolerance to being out of the sling for 24 hours with pain <2/10     Long Term Goals: 24 weeks   Pt will be compliant with % of prescribed amount.   The pt to improvement in AROM of R shoulder within 80% of L shoulder to improve tolerance to OH movement   The pt to  Demo at least 4+/5 of RTC muscle testing to demo improvement in tolerance to activity  The pt to tolerate lifting 50# overhead to improve tolerance to ADLs and work related activities   The pt will report full participation in ADLs and IADLs without restrictions related to R Shoulder.      PLAN     Follow SLAP Repair Guidelines    Jailene Paige, PTA

## 2024-03-20 ENCOUNTER — CLINICAL SUPPORT (OUTPATIENT)
Dept: REHABILITATION | Facility: HOSPITAL | Age: 34
End: 2024-03-20
Payer: OTHER GOVERNMENT

## 2024-03-20 DIAGNOSIS — M25.611 DECREASED RANGE OF MOTION OF RIGHT SHOULDER: ICD-10-CM

## 2024-03-20 DIAGNOSIS — M25.511 ACUTE PAIN OF RIGHT SHOULDER: Primary | ICD-10-CM

## 2024-03-20 PROCEDURE — 97112 NEUROMUSCULAR REEDUCATION: CPT | Mod: CQ

## 2024-03-20 PROCEDURE — 97140 MANUAL THERAPY 1/> REGIONS: CPT | Mod: CQ

## 2024-03-20 PROCEDURE — 97530 THERAPEUTIC ACTIVITIES: CPT | Mod: CQ

## 2024-03-20 NOTE — PROGRESS NOTES
[facility]   Physical Therapy Treatment Note     Name: Jasson Velasco  [facility] Number: [id number]    Therapy Diagnosis:   Encounter Diagnoses   Name Primary?    Acute pain of right shoulder Yes    Decreased range of motion of right shoulder      Physician: Willy Heller III, *    Visit Date: 3/20/2024    Physician Orders: PT Eval and Treat  Medical Diagnosis from Referral: M25.511 (ICD-10-CM) - Right shoulder pain, unspecified chronicity   Evaluation Date: 2/8/2024  Authorization Period Expiration: 5/24/2024  Plan of Care Expiration: 9/8/2024  Progress Note Due: 3/8/2024  Visit # / Visits authorized: 6/ 15        Time In: 0900  Time Out: 1000  Total Billable Time: 60 minutes     DOS: 1/18/2024  S/p: 1. Shoulder arthroscopic posterior labral repair, capsulorrhaphy, 7:00 position (CPT 09073)  2. Shoulder arthroscopic SLAP type 8 repair (CPT 02243)  3. Shoulder arthroscopic extensive debridement (anterior, posterior glenohumeral joint, subacromial space) (CPT 18999)  4. Shoulder manipulation under anesthesia (CPT 06871)  5. Shoulder arthroscopic lysis of adhesions (CPT 08547)  6. Shoulder arthroscopic loose body removal  7. Shoulder arthroscopic distal clavicle excision (CPT 99677)  8. Shoulder arthroscopic subacromial decompression, bursectomy   Post-Op Precautions: We will follow the arthroscopic posterior labral repair with biceps tenodesis and SLAP repair guidelines. We discussed with the patient's family after surgery. The patient will remain in a sling for 6 weeks. We will start PT at the 2 week nery.         Precautions: Standard      SUBJECTIVE     Pt reports:shoulder is feeling better    He was compliant with home exercise program.  Response to previous treatment: improvement in resting pain in shoulder joint   Functional change: na     Pain: 3/10  Location: right shoulder      OBJECTIVE     PROM R Shoulder:  Shoulder Flexion: 100  Shoulder ER: 30 deg     AROM Shoulder   Flexion 80    ER not tested     Treatment       Jasson received the treatments listed below:      Manual therapy techniques: Joint mobilizations were applied to the: R shoulder for 10 minutes, including:  Skilled PROM into shoulder flexion, abduction, ER per post-operative guidelines to improve range of motion and mobility at the glenohumeral joint and prevent contracture.    Grade II GH joint inferior mobs    Therapeutic activities to improve functional performance for 10  minutes, including:  Landmine dowel 4x10     Neuromuscular re-education activities to improve: Balance and Posture for 40 minutes. The following activities were included:  Pulley x 5 min  UT level 1 on wall 3x15   Wall slides with other arm assistance 3x10   IR/ER walkouts BTB/GTB  Ball on wall ABC 3x    Patient Education and Home Exercises     Home Exercises Provided and Patient Education Provided     Education provided:   - cont to wear sling     Written Home Exercises Provided: Patient instructed to cont prior HEP. Exercises were reviewed and Jasson was able to demonstrate them prior to the end of the session.  Jasson demonstrated good  understanding of the education provided. See EMR under Patient Instructions for exercises provided during therapy sessions    ASSESSMENT     Jasson is doing well with improvements in PROM. Progressed pt to work on shoulder stability with good tolerance but noted fatigue. Increased band resistance with RC strengthening today,    Jasson Is progressing well towards his goals.     Pt prognosis is Good.     Pt will continue to benefit from skilled outpatient physical therapy to address the deficits listed in the problem list box on initial evaluation, provide pt/family education and to maximize pt's level of independence in the home and community environment.     Pt's spiritual, cultural and educational needs considered and pt agreeable to plan of care and goals.     Anticipated barriers to physical therapy: AC Joint Injury the  past    Goals:   Short Term Goals: 6 weeks  1. Pt will be compliant with HEP 50% of prescribed amount.   2. The pt to demo improvement in R shoulder PROM to by 80% of the L  3.  The pt to demo tolerance to being out of the sling for 24 hours with pain <2/10     Long Term Goals: 24 weeks   Pt will be compliant with % of prescribed amount.   The pt to improvement in AROM of R shoulder within 80% of L shoulder to improve tolerance to OH movement   The pt to  Demo at least 4+/5 of RTC muscle testing to demo improvement in tolerance to activity  The pt to tolerate lifting 50# overhead to improve tolerance to ADLs and work related activities   The pt will report full participation in ADLs and IADLs without restrictions related to R Shoulder.      PLAN     Follow SLAP Repair Guidelines    Jailene Paige, PTA

## 2024-03-27 ENCOUNTER — CLINICAL SUPPORT (OUTPATIENT)
Dept: REHABILITATION | Facility: HOSPITAL | Age: 34
End: 2024-03-27
Attending: PHYSICIAN ASSISTANT
Payer: OTHER GOVERNMENT

## 2024-03-27 DIAGNOSIS — M25.611 DECREASED RANGE OF MOTION OF RIGHT SHOULDER: ICD-10-CM

## 2024-03-27 DIAGNOSIS — M25.511 ACUTE PAIN OF RIGHT SHOULDER: Primary | ICD-10-CM

## 2024-03-27 PROCEDURE — 97112 NEUROMUSCULAR REEDUCATION: CPT | Mod: CQ

## 2024-03-27 NOTE — PROGRESS NOTES
[facility]   Physical Therapy Treatment Note     Name: Jasson Velasco  [facility] Number: [id number]    Therapy Diagnosis:   Encounter Diagnoses   Name Primary?    Acute pain of right shoulder Yes    Decreased range of motion of right shoulder      Physician: Willy Heller III, *    Visit Date: 3/27/2024    Physician Orders: PT Eval and Treat  Medical Diagnosis from Referral: M25.511 (ICD-10-CM) - Right shoulder pain, unspecified chronicity   Evaluation Date: 2/8/2024  Authorization Period Expiration: 5/24/2024  Plan of Care Expiration: 9/8/2024  Progress Note Due: 3/8/2024  Visit # / Visits authorized: 7/ 15        Time In: 1000  Time Out: 1100  Total Billable Time: 30 minutes     DOS: 1/18/2024  S/p: 1. Shoulder arthroscopic posterior labral repair, capsulorrhaphy, 7:00 position (CPT 28556)  2. Shoulder arthroscopic SLAP type 8 repair (CPT 41474)  3. Shoulder arthroscopic extensive debridement (anterior, posterior glenohumeral joint, subacromial space) (CPT 43885)  4. Shoulder manipulation under anesthesia (CPT 33994)  5. Shoulder arthroscopic lysis of adhesions (CPT 50023)  6. Shoulder arthroscopic loose body removal  7. Shoulder arthroscopic distal clavicle excision (CPT 07129)  8. Shoulder arthroscopic subacromial decompression, bursectomy   Post-Op Precautions: We will follow the arthroscopic posterior labral repair with biceps tenodesis and SLAP repair guidelines. We discussed with the patient's family after surgery. The patient will remain in a sling for 6 weeks. We will start PT at the 2 week nery.         Precautions: Standard      SUBJECTIVE     Pt reports:shoulder is feeling better    He was compliant with home exercise program.  Response to previous treatment: improvement in resting pain in shoulder joint   Functional change: na     Pain: 3/10  Location: right shoulder      OBJECTIVE     PROM R Shoulder:  Shoulder Flexion: 100  Shoulder ER: 30 deg     AROM Shoulder   Flexion 80    ER not tested     Treatment       Jasson received the treatments listed below:      Manual therapy techniques: Joint mobilizations were applied to the: R shoulder for 10 minutes, including:  Skilled PROM into shoulder flexion, abduction, ER per post-operative guidelines to improve range of motion and mobility at the glenohumeral joint and prevent contracture.    Grade II GH joint inferior mobs    Therapeutic activities to improve functional performance for 10  minutes, including:  Landmine dowel 4x10     Neuromuscular re-education activities to improve: Balance and Posture for 40 minutes. The following activities were included:  Pulley x 5 min  UT level 1 on wall 3x15   Wall slides with other arm assistance 3x10   IR/ER walkouts BTB/GTB  Ball on wall ABC 3x  Robots with foam roll 3x12    Patient Education and Home Exercises     Home Exercises Provided and Patient Education Provided     Education provided:   - cont to wear sling     Written Home Exercises Provided: Patient instructed to cont prior HEP. Exercises were reviewed and Jasson was able to demonstrate them prior to the end of the session.  Jasson demonstrated good  understanding of the education provided. See EMR under Patient Instructions for exercises provided during therapy sessions    ASSESSMENT     Jasson is doing well with improvements in PROM. Progressed pt to work on shoulder stability with good tolerance but noted fatigue. Increased band resistance with RC strengthening today. R scapula is depressed compared to L scapula    Jasson Is progressing well towards his goals.     Pt prognosis is Good.     Pt will continue to benefit from skilled outpatient physical therapy to address the deficits listed in the problem list box on initial evaluation, provide pt/family education and to maximize pt's level of independence in the home and community environment.     Pt's spiritual, cultural and educational needs considered and pt agreeable to plan of care and goals.      Anticipated barriers to physical therapy: AC Joint Injury the past    Goals:   Short Term Goals: 6 weeks  1. Pt will be compliant with HEP 50% of prescribed amount.   2. The pt to demo improvement in R shoulder PROM to by 80% of the L  3.  The pt to demo tolerance to being out of the sling for 24 hours with pain <2/10     Long Term Goals: 24 weeks   Pt will be compliant with % of prescribed amount.   The pt to improvement in AROM of R shoulder within 80% of L shoulder to improve tolerance to OH movement   The pt to  Demo at least 4+/5 of RTC muscle testing to demo improvement in tolerance to activity  The pt to tolerate lifting 50# overhead to improve tolerance to ADLs and work related activities   The pt will report full participation in ADLs and IADLs without restrictions related to R Shoulder.      PLAN     Follow SLAP Repair Guidelines    Jailene Paige, PTA

## 2024-04-03 ENCOUNTER — CLINICAL SUPPORT (OUTPATIENT)
Dept: REHABILITATION | Facility: HOSPITAL | Age: 34
End: 2024-04-03
Payer: OTHER GOVERNMENT

## 2024-04-03 DIAGNOSIS — M25.611 DECREASED RANGE OF MOTION OF RIGHT SHOULDER: ICD-10-CM

## 2024-04-03 DIAGNOSIS — M25.511 ACUTE PAIN OF RIGHT SHOULDER: Primary | ICD-10-CM

## 2024-04-03 PROCEDURE — 97140 MANUAL THERAPY 1/> REGIONS: CPT

## 2024-04-03 PROCEDURE — 97112 NEUROMUSCULAR REEDUCATION: CPT

## 2024-04-03 PROCEDURE — 97530 THERAPEUTIC ACTIVITIES: CPT

## 2024-04-03 NOTE — PROGRESS NOTES
[facility]   Physical Therapy Treatment Note     Name: Jasson Velasco  Clinic Number: [id number]    Therapy Diagnosis:   No diagnosis found.    Physician: Willy Heller III, *    Visit Date: 4/3/2024    Physician Orders: PT Eval and Treat  Medical Diagnosis from Referral: M25.511 (ICD-10-CM) - Right shoulder pain, unspecified chronicity   Evaluation Date: 2/8/2024  Authorization Period Expiration: 5/24/2024  Plan of Care Expiration: 9/8/2024  Progress Note Due: 3/8/2024  Visit # / Visits authorized: 8/ 15        Time In: 0900  Time Out: 1000  Total Billable Time: 60 minutes     DOS: 1/18/2024  S/p: 1. Shoulder arthroscopic posterior labral repair, capsulorrhaphy, 7:00 position (CPT 36664)  2. Shoulder arthroscopic SLAP type 8 repair (CPT 27354)  3. Shoulder arthroscopic extensive debridement (anterior, posterior glenohumeral joint, subacromial space) (CPT 02787)  4. Shoulder manipulation under anesthesia (CPT 11957)  5. Shoulder arthroscopic lysis of adhesions (CPT 99526)  6. Shoulder arthroscopic loose body removal  7. Shoulder arthroscopic distal clavicle excision (CPT 73883)  8. Shoulder arthroscopic subacromial decompression, bursectomy   Post-Op Precautions: We will follow the arthroscopic posterior labral repair with biceps tenodesis and SLAP repair guidelines. We discussed with the patient's family after surgery. The patient will remain in a sling for 6 weeks. We will start PT at the 2 week nery.         Precautions: Standard      SUBJECTIVE     Pt reports:shoulder is feeling better    He was compliant with home exercise program.  Response to previous treatment: improvement in resting pain in shoulder joint   Functional change: na     Pain: 3/10  Location: right shoulder      OBJECTIVE     PROM R Shoulder:  Shoulder Flexion: 150 R   Shoulder ER at 90: 70 deg R    AROM Shoulder   Flexion 130 pre manual R; 140 post manual R --- L 145      Treatment       Jasson received the treatments  listed below:      Manual therapy techniques: Joint mobilizations were applied to the: R shoulder for 10 minutes, including:  Grade II GH joint post mobs  Thoracic manip   Cross body MET with scap pinning     Therapeutic activities to improve functional performance for 15  minutes, including:  Hand heel rocks 20x   UBE fwd/back for endurance 5m ea level 7   Education     Next time   Landmine dowel 4x10     Neuromuscular re-education activities to improve: Balance and Posture for 35 minutes. The following activities were included:  UT level 1 on wall 3x15   Robots with foam roll 3x12 standing   IR/ER walkouts BTB/GTB    Next time:   Wall slides with other arm assistance 3x10   Ball on wall ABC 3x    Patient Education and Home Exercises     Home Exercises Provided and Patient Education Provided     Education provided:   - cont to wear sling     Written Home Exercises Provided: Patient instructed to cont prior HEP. Exercises were reviewed and Jasson was able to demonstrate them prior to the end of the session.  Jasson demonstrated good  understanding of the education provided. See EMR under Patient Instructions for exercises provided during therapy sessions    ASSESSMENT     The patient is progressing well for 11 weeks s/p labral repair. The patient with excellent tolerance to manual. Limitations in post cuff ms length and expected limitations in high ER and IR at this time that will cont to be avoided stressing for another 2-4 weeks. Advised on a return to running program that he can begin so long as he cont to be pain free in shoulder at rest and with most activities.     Jasson Is progressing well towards his goals.     Pt prognosis is Good.     Pt will continue to benefit from skilled outpatient physical therapy to address the deficits listed in the problem list box on initial evaluation, provide pt/family education and to maximize pt's level of independence in the home and community environment.     Pt's spiritual,  cultural and educational needs considered and pt agreeable to plan of care and goals.     Anticipated barriers to physical therapy: AC Joint Injury the past    Goals:   Short Term Goals: 6 weeks  1. Pt will be compliant with HEP 50% of prescribed amount.   2. The pt to demo improvement in R shoulder PROM to by 80% of the L  3.  The pt to demo tolerance to being out of the sling for 24 hours with pain <2/10     Long Term Goals: 24 weeks   Pt will be compliant with % of prescribed amount.   The pt to improvement in AROM of R shoulder within 80% of L shoulder to improve tolerance to OH movement   The pt to  Demo at least 4+/5 of RTC muscle testing to demo improvement in tolerance to activity  The pt to tolerate lifting 50# overhead to improve tolerance to ADLs and work related activities   The pt will report full participation in ADLs and IADLs without restrictions related to R Shoulder.      PLAN     Follow SLAP Repair Guidelines    Supriya Mesa, PT

## 2024-04-17 ENCOUNTER — CLINICAL SUPPORT (OUTPATIENT)
Dept: REHABILITATION | Facility: HOSPITAL | Age: 34
End: 2024-04-17
Payer: OTHER GOVERNMENT

## 2024-04-17 DIAGNOSIS — M25.611 DECREASED RANGE OF MOTION OF RIGHT SHOULDER: ICD-10-CM

## 2024-04-17 DIAGNOSIS — M25.511 ACUTE PAIN OF RIGHT SHOULDER: Primary | ICD-10-CM

## 2024-04-17 PROCEDURE — 97140 MANUAL THERAPY 1/> REGIONS: CPT | Mod: CQ

## 2024-04-17 PROCEDURE — 97530 THERAPEUTIC ACTIVITIES: CPT | Mod: CQ

## 2024-04-17 PROCEDURE — 97112 NEUROMUSCULAR REEDUCATION: CPT | Mod: CQ

## 2024-04-17 NOTE — PROGRESS NOTES
[facility]   Physical Therapy Treatment Note     Name: Jasson Velasco  [facility] Number: [id number]    Therapy Diagnosis:   Encounter Diagnoses   Name Primary?    Acute pain of right shoulder Yes    Decreased range of motion of right shoulder        Physician: Willy Heller III, *    Visit Date: 4/17/2024    Physician Orders: PT Eval and Treat  Medical Diagnosis from Referral: M25.511 (ICD-10-CM) - Right shoulder pain, unspecified chronicity   Evaluation Date: 2/8/2024  Authorization Period Expiration: 5/24/2024  Plan of Care Expiration: 9/8/2024  Progress Note Due: 3/8/2024  Visit # / Visits authorized: 9/ 15        Time In: 0900  Time Out: 1000  Total Billable Time: 60 minutes     DOS: 1/18/2024  S/p: 1. Shoulder arthroscopic posterior labral repair, capsulorrhaphy, 7:00 position (CPT 88965)  2. Shoulder arthroscopic SLAP type 8 repair (CPT 50833)  3. Shoulder arthroscopic extensive debridement (anterior, posterior glenohumeral joint, subacromial space) (CPT 24018)  4. Shoulder manipulation under anesthesia (CPT 71873)  5. Shoulder arthroscopic lysis of adhesions (CPT 51701)  6. Shoulder arthroscopic loose body removal  7. Shoulder arthroscopic distal clavicle excision (CPT 56983)  8. Shoulder arthroscopic subacromial decompression, bursectomy   Post-Op Precautions: We will follow the arthroscopic posterior labral repair with biceps tenodesis and SLAP repair guidelines. We discussed with the patient's family after surgery. The patient will remain in a sling for 6 weeks. We will start PT at the 2 week nery.         Precautions: Standard      SUBJECTIVE     Pt reports:shoulder is feeling better. Feels good to jog    He was compliant with home exercise program.  Response to previous treatment: improvement in resting pain in shoulder joint   Functional change: na     Pain: 3/10  Location: right shoulder      OBJECTIVE     PROM R Shoulder:  Shoulder Flexion: 150 R   Shoulder ER at 90: 70 deg  R    AROM Shoulder   Flexion 130 pre manual R; 140 post manual R --- L 145      Treatment       Jasson received the treatments listed below:      Manual therapy techniques: Joint mobilizations were applied to the: R shoulder for 10 minutes, including:  Grade II GH joint post mobs  Thoracic manip -np  Cross body MET with scap pinning   PA T/S mobs grade II-III  Ribs spring mob    Therapeutic activities to improve functional performance for 20  minutes, including:  Hand heel rocks 20x -np  UBE fwd/back for endurance 5m ea level 7   Landmine dowel 4x10   SL T/S rotations 20x    Neuromuscular re-education activities to improve: Balance and Posture for 30 minutes. The following activities were included:  UT level 1 on wall 3x15   Robots with foam roll 3x12 standing   IR/ER walkouts PTB/BTB 3x12  Wall slides with other arm assistance 3x10   Ball on wall ABC 2x    Patient Education and Home Exercises     Home Exercises Provided and Patient Education Provided     Education provided:   - cont to wear sling     Written Home Exercises Provided: Patient instructed to cont prior HEP. Exercises were reviewed and Jasson was able to demonstrate them prior to the end of the session.  Jasson demonstrated good  understanding of the education provided. See EMR under Patient Instructions for exercises provided during therapy sessions    ASSESSMENT     Jasson tolerated session well today without adverse reaction. Good scapular upward rotation with AAROM FL. Shoulder depression is improving. Will cont to progress as tolerate.     Jasson Is progressing well towards his goals.     Pt prognosis is Good.     Pt will continue to benefit from skilled outpatient physical therapy to address the deficits listed in the problem list box on initial evaluation, provide pt/family education and to maximize pt's level of independence in the home and community environment.     Pt's spiritual, cultural and educational needs considered and pt agreeable to plan of  care and goals.     Anticipated barriers to physical therapy: AC Joint Injury the past    Goals:   Short Term Goals: 6 weeks  1. Pt will be compliant with HEP 50% of prescribed amount.   2. The pt to demo improvement in R shoulder PROM to by 80% of the L  3.  The pt to demo tolerance to being out of the sling for 24 hours with pain <2/10     Long Term Goals: 24 weeks   Pt will be compliant with % of prescribed amount.   The pt to improvement in AROM of R shoulder within 80% of L shoulder to improve tolerance to OH movement   The pt to  Demo at least 4+/5 of RTC muscle testing to demo improvement in tolerance to activity  The pt to tolerate lifting 50# overhead to improve tolerance to ADLs and work related activities   The pt will report full participation in ADLs and IADLs without restrictions related to R Shoulder.      PLAN     Follow SLAP Repair Guidelines    Jailene Paige, PTA

## 2024-04-24 ENCOUNTER — CLINICAL SUPPORT (OUTPATIENT)
Dept: REHABILITATION | Facility: HOSPITAL | Age: 34
End: 2024-04-24
Payer: OTHER GOVERNMENT

## 2024-04-24 DIAGNOSIS — M25.511 ACUTE PAIN OF RIGHT SHOULDER: Primary | ICD-10-CM

## 2024-04-24 DIAGNOSIS — M25.611 DECREASED RANGE OF MOTION OF RIGHT SHOULDER: ICD-10-CM

## 2024-04-24 PROCEDURE — 97112 NEUROMUSCULAR REEDUCATION: CPT | Mod: CQ

## 2024-04-24 PROCEDURE — 97530 THERAPEUTIC ACTIVITIES: CPT | Mod: CQ

## 2024-04-24 PROCEDURE — 97140 MANUAL THERAPY 1/> REGIONS: CPT | Mod: CQ

## 2024-04-24 NOTE — PROGRESS NOTES
[facility]   Physical Therapy Treatment Note     Name: Jasson Velasco  [facility] Number: [id number]    Therapy Diagnosis:   Encounter Diagnoses   Name Primary?    Acute pain of right shoulder Yes    Decreased range of motion of right shoulder        Physician: Willy Heller III, *    Visit Date: 4/24/2024    Physician Orders: PT Eval and Treat  Medical Diagnosis from Referral: M25.511 (ICD-10-CM) - Right shoulder pain, unspecified chronicity   Evaluation Date: 2/8/2024  Authorization Period Expiration: 5/24/2024  Plan of Care Expiration: 9/8/2024  Progress Note Due: 3/8/2024  Visit # / Visits authorized: 10/ 15        Time In: 1005  Time Out: 11:00  Total Billable Time: 55 minutes     DOS: 1/18/2024  S/p: 1. Shoulder arthroscopic posterior labral repair, capsulorrhaphy, 7:00 position (CPT 73916)  2. Shoulder arthroscopic SLAP type 8 repair (CPT 38287)  3. Shoulder arthroscopic extensive debridement (anterior, posterior glenohumeral joint, subacromial space) (CPT 54502)  4. Shoulder manipulation under anesthesia (CPT 62150)  5. Shoulder arthroscopic lysis of adhesions (CPT 39810)  6. Shoulder arthroscopic loose body removal  7. Shoulder arthroscopic distal clavicle excision (CPT 61533)  8. Shoulder arthroscopic subacromial decompression, bursectomy   Post-Op Precautions: We will follow the arthroscopic posterior labral repair with biceps tenodesis and SLAP repair guidelines. We discussed with the patient's family after surgery. The patient will remain in a sling for 6 weeks. We will start PT at the 2 week nery.         Precautions: Standard      SUBJECTIVE     Pt reports:shoulder feels more stable since jogging    He was compliant with home exercise program.  Response to previous treatment: improvement in resting pain in shoulder joint   Functional change: na     Pain: 3/10  Location: right shoulder      OBJECTIVE     PROM R Shoulder:  Shoulder Flexion: 155 R   Shoulder ER at 90: 70 deg  R    AROM Shoulder   Flexion 130 pre manual R; 135 post manual R --- L 145      Treatment       Jasson received the treatments listed below:      Manual therapy techniques: Joint mobilizations were applied to the: R shoulder for 10 minutes, including:  Grade II GH joint post mobs  Thoracic manip (performed by Reno Santillan)  Cross body MET with scap pinning       Therapeutic activities to improve functional performance for 20  minutes, including:  Hand heel rocks 20x -np  UBE fwd/back for endurance 5m ea level 7   Landmine dowel 4x10   SL T/S rotations 20x    Neuromuscular re-education activities to improve: Balance and Posture for 25 minutes. The following activities were included:  UT level 1 on wall 3x15   Robots with foam roll 3x12 standing against wall  IR/ER walkouts PTB/BTB 3x12  SA wall slides 3x10   Ball on wall ABC 2x    Patient Education and Home Exercises     Home Exercises Provided and Patient Education Provided     Education provided:   - cont to wear sling     Written Home Exercises Provided: Patient instructed to cont prior HEP. Exercises were reviewed and Jasson was able to demonstrate them prior to the end of the session.  Jasson demonstrated good  understanding of the education provided. See EMR under Patient Instructions for exercises provided during therapy sessions    ASSESSMENT     Jasson Improved PROM at start of session. 5 degree increase in shoulder after T/S manipulation from PT listed above. Noted shoulder fatigue with robots on wall.     Jasson Is progressing well towards his goals.     Pt prognosis is Good.     Pt will continue to benefit from skilled outpatient physical therapy to address the deficits listed in the problem list box on initial evaluation, provide pt/family education and to maximize pt's level of independence in the home and community environment.     Pt's spiritual, cultural and educational needs considered and pt agreeable to plan of care and goals.     Anticipated barriers to  physical therapy: AC Joint Injury the past    Goals:   Short Term Goals: 6 weeks  1. Pt will be compliant with HEP 50% of prescribed amount.   2. The pt to demo improvement in R shoulder PROM to by 80% of the L  3.  The pt to demo tolerance to being out of the sling for 24 hours with pain <2/10     Long Term Goals: 24 weeks   Pt will be compliant with % of prescribed amount.   The pt to improvement in AROM of R shoulder within 80% of L shoulder to improve tolerance to OH movement   The pt to  Demo at least 4+/5 of RTC muscle testing to demo improvement in tolerance to activity  The pt to tolerate lifting 50# overhead to improve tolerance to ADLs and work related activities   The pt will report full participation in ADLs and IADLs without restrictions related to R Shoulder.      PLAN     Follow SLAP Repair Guidelines    Jailene Paige, PTA

## 2024-05-02 ENCOUNTER — DOCUMENTATION ONLY (OUTPATIENT)
Dept: REHABILITATION | Facility: HOSPITAL | Age: 34
End: 2024-05-02
Payer: OTHER GOVERNMENT

## 2024-05-02 NOTE — PROGRESS NOTES
Pt unable to make therapy session today due to his route to PT being blocked. Discussed with patient HEP as well as running progression to complete as tolerated. Will re-evaluate the patient on 5/8     Supriya Mesa PT, DPT, SCS, FAAOMPT

## 2024-05-22 ENCOUNTER — CLINICAL SUPPORT (OUTPATIENT)
Dept: REHABILITATION | Facility: HOSPITAL | Age: 34
End: 2024-05-22
Payer: OTHER GOVERNMENT

## 2024-05-22 DIAGNOSIS — M25.511 ACUTE PAIN OF RIGHT SHOULDER: Primary | ICD-10-CM

## 2024-05-22 DIAGNOSIS — M25.611 DECREASED RANGE OF MOTION OF RIGHT SHOULDER: ICD-10-CM

## 2024-05-22 PROCEDURE — 97530 THERAPEUTIC ACTIVITIES: CPT

## 2024-05-22 PROCEDURE — 97112 NEUROMUSCULAR REEDUCATION: CPT

## 2024-05-24 NOTE — PROGRESS NOTES
[facility]   Physical Therapy Treatment Note     Name: Jasson Velasco  [facility] Number: [id number]    Therapy Diagnosis:   Encounter Diagnoses   Name Primary?    Acute pain of right shoulder Yes    Decreased range of motion of right shoulder        Physician: Willy Heller III, *    Visit Date: 5/22/2024    Physician Orders: PT Eval and Treat  Medical Diagnosis from Referral: M25.511 (ICD-10-CM) - Right shoulder pain, unspecified chronicity   Evaluation Date: 2/8/2024  Authorization Period Expiration: 5/24/2024  Plan of Care Expiration: 9/8/2024  Progress Note Due: 3/8/2024  Visit # / Visits authorized: 10/ 15        Time In: 1005  Time Out: 11:00  Total Billable Time: 55 minutes     DOS: 1/18/2024  S/p: 1. Shoulder arthroscopic posterior labral repair, capsulorrhaphy, 7:00 position (CPT 15625)  2. Shoulder arthroscopic SLAP type 8 repair (CPT 53419)  3. Shoulder arthroscopic extensive debridement (anterior, posterior glenohumeral joint, subacromial space) (CPT 16160)  4. Shoulder manipulation under anesthesia (CPT 67437)  5. Shoulder arthroscopic lysis of adhesions (CPT 84587)  6. Shoulder arthroscopic loose body removal  7. Shoulder arthroscopic distal clavicle excision (CPT 40925)  8. Shoulder arthroscopic subacromial decompression, bursectomy   Post-Op Precautions: We will follow the arthroscopic posterior labral repair with biceps tenodesis and SLAP repair guidelines. We discussed with the patient's family after surgery. The patient will remain in a sling for 6 weeks. We will start PT at the 2 week nery.         Precautions: Standard      SUBJECTIVE     Pt reports: shoulder is overall feeling better, still weak though     He was compliant with home exercise program.  Response to previous treatment: improvement in resting pain in shoulder joint   Functional change: na     Pain: 0/10  Location: right shoulder      OBJECTIVE     HHD for Upper Extremity RTC   Right Left   Scaption 1 15.3  19.9                   2 14.8 18.9                   3 x 17.7   Average 15.05 18.8   LSI 80%   90-90 ER 1 25.6 x                   2 28.7 28.2                   3 23.0 22.4   Average 25.8 25.2   %   IR 90-90 1 31.8 25.1                  2 32.8 27.5                  3 33.4 27.6   Average 32.67 26.7   %     HHD Scap Muscles: unable to hold test position   Mid Trap 3/5 R; 3+/5 L   Lower Trap 3/5 R; 3+/5 L   Treatment   Extender used during Treatment to A with care    Jasson received the treatments listed below:      Neuromuscular re-education activities to improve: motor control for 30 minutes. The following activities were included:   Prone Mid Trap level 1 20x 5s holds   Prone LT level 1 20x 5s holds  Resisted ER 3x15 gtb   Resisted IR 2x15 btb       Therapeutic activities to improve functional performance for 23  minutes, including:  UE Testing HHD   Activity modification     Patient Education and Home Exercises     Home Exercises Provided and Patient Education Provided     Education provided:   - cont to wear sling     Written Home Exercises Provided: Patient instructed to cont prior HEP. Exercises were reviewed and Jasson was able to demonstrate them prior to the end of the session.  Jasson demonstrated good  understanding of the education provided. See EMR under Patient Instructions for exercises provided during therapy sessions    ASSESSMENT     Completed HHD testing to RTC muscles today to determine current status with straight deficits. The pt most deficit in scap muscles and scaption. Unable to test HHD with scap muscles bc pt not able to hold testing position due to weakness in mid-trap and LT. The patient tolerated progression of exercises. Will progress more exercises next week.     Jasson Is progressing well towards his goals.     Pt prognosis is Good.     Pt will continue to benefit from skilled outpatient physical therapy to address the deficits listed in the problem list box on initial  evaluation, provide pt/family education and to maximize pt's level of independence in the home and community environment.     Pt's spiritual, cultural and educational needs considered and pt agreeable to plan of care and goals.     Anticipated barriers to physical therapy: AC Joint Injury the past    Goals:   Short Term Goals: 6 weeks  1. Pt will be compliant with HEP 50% of prescribed amount.   2. The pt to demo improvement in R shoulder PROM to by 80% of the L  3.  The pt to demo tolerance to being out of the sling for 24 hours with pain <2/10     Long Term Goals: 24 weeks   Pt will be compliant with % of prescribed amount.   The pt to improvement in AROM of R shoulder within 80% of L shoulder to improve tolerance to OH movement   The pt to  Demo at least 4+/5 of RTC muscle testing to demo improvement in tolerance to activity  The pt to tolerate lifting 50# overhead to improve tolerance to ADLs and work related activities   The pt will report full participation in ADLs and IADLs without restrictions related to R Shoulder.      PLAN     Progress to scap muscle strengthening, progress load for other movements     Supriya Mesa, PT

## 2024-06-12 ENCOUNTER — CLINICAL SUPPORT (OUTPATIENT)
Dept: REHABILITATION | Facility: HOSPITAL | Age: 34
End: 2024-06-12
Payer: OTHER GOVERNMENT

## 2024-06-12 DIAGNOSIS — M25.611 DECREASED RANGE OF MOTION OF RIGHT SHOULDER: ICD-10-CM

## 2024-06-12 DIAGNOSIS — M25.511 ACUTE PAIN OF RIGHT SHOULDER: Primary | ICD-10-CM

## 2024-06-12 PROCEDURE — 97112 NEUROMUSCULAR REEDUCATION: CPT

## 2024-06-12 PROCEDURE — 97530 THERAPEUTIC ACTIVITIES: CPT

## 2024-06-13 NOTE — PROGRESS NOTES
[facility]   Physical Therapy Treatment Note     Name: Jasson Velasco  [facility] Number: [id number]    Therapy Diagnosis:   Encounter Diagnoses   Name Primary?    Acute pain of right shoulder Yes    Decreased range of motion of right shoulder        Physician: Willy Heller III, *    Visit Date: 6/12/2024    Physician Orders: PT Eval and Treat  Medical Diagnosis from Referral: M25.511 (ICD-10-CM) - Right shoulder pain, unspecified chronicity   Evaluation Date: 2/8/2024  Authorization Period Expiration: 5/24/2024  Plan of Care Expiration: 9/8/2024  Progress Note Due: 3/8/2024  Visit # / Visits authorized: 11/ 15        Time In:0913  Time Out: 1000  Total Billable Time: 48 minutes     DOS: 1/18/2024  S/p: 1. Shoulder arthroscopic posterior labral repair, capsulorrhaphy, 7:00 position (CPT 39023)  2. Shoulder arthroscopic SLAP type 8 repair (CPT 82171)  3. Shoulder arthroscopic extensive debridement (anterior, posterior glenohumeral joint, subacromial space) (CPT 54032)  4. Shoulder manipulation under anesthesia (CPT 55039)  5. Shoulder arthroscopic lysis of adhesions (CPT 23233)  6. Shoulder arthroscopic loose body removal  7. Shoulder arthroscopic distal clavicle excision (CPT 69437)  8. Shoulder arthroscopic subacromial decompression, bursectomy   Post-Op Precautions: We will follow the arthroscopic posterior labral repair with biceps tenodesis and SLAP repair guidelines. We discussed with the patient's family after surgery. The patient will remain in a sling for 6 weeks. We will start PT at the 2 week nery.         Precautions: Standard      SUBJECTIVE     Pt reports: has been practicing the new exercises at home.     He was compliant with home exercise program.  Response to previous treatment: improvement in resting pain in shoulder joint   Functional change: na     Pain: 0/10  Location: right shoulder      OBJECTIVE     HHD for Upper Extremity RTC   Right Left   Scaption 1 15.3 19.9                    2 14.8 18.9                   3 x 17.7   Average 15.05 18.8   LSI 80%   90-90 ER 1 25.6 x                   2 28.7 28.2                   3 23.0 22.4   Average 25.8 25.2   %   IR 90-90 1 31.8 25.1                  2 32.8 27.5                  3 33.4 27.6   Average 32.67 26.7   %     HHD Scap Muscles: unable to hold test position   Mid Trap 3/5 R; 3+/5 L   Lower Trap 3/5 R; 3+/5 L   Treatment   Extender used during Treatment to A with care    Jasson received the treatments listed below:      Neuromuscular re-education activities to improve: motor control for  38 minutes. The following activities were included:   Prone Mid Trap level 1 20x 5s holds (initiated with 5min of cueing)   Prone Mid Trap level 2 20x   Sidelying horiz ABD 3x10   ABDc on wall in horiz ABD 3x        Therapeutic activities to improve functional performance for 10  minutes, including:  UBE single arm 3m forward 3m backward   Education on mvoements for home     Patient Education and Home Exercises     Home Exercises Provided and Patient Education Provided     Education provided:   - cont to wear sling     Written Home Exercises Provided: Patient instructed to cont prior HEP. Exercises were reviewed and Jasson was able to demonstrate them prior to the end of the session.  Jasson demonstrated good  understanding of the education provided. See EMR under Patient Instructions for exercises provided during therapy sessions    ASSESSMENT     The patient with difficulty isolating mid-trap, compensation seen with UT and pec/lat during attempts. By the end of the session the patient was able to complete mid-trap exercises without compensation. Difficulty with subscap+LT activation in ABD movements but tolerated cueing with good carryover noted. Advised to cont to complete lifting at home that does not irritate shoulder while we work on scap ms and RTC strengthening in PT.     Jasson Is progressing well towards his goals.     Pt prognosis is  Good.     Pt will continue to benefit from skilled outpatient physical therapy to address the deficits listed in the problem list box on initial evaluation, provide pt/family education and to maximize pt's level of independence in the home and community environment.     Pt's spiritual, cultural and educational needs considered and pt agreeable to plan of care and goals.     Anticipated barriers to physical therapy: AC Joint Injury the past    Goals:   Short Term Goals: 6 weeks  1. Pt will be compliant with HEP 50% of prescribed amount.   2. The pt to demo improvement in R shoulder PROM to by 80% of the L  3.  The pt to demo tolerance to being out of the sling for 24 hours with pain <2/10     Long Term Goals: 24 weeks   Pt will be compliant with % of prescribed amount.   The pt to improvement in AROM of R shoulder within 80% of L shoulder to improve tolerance to OH movement   The pt to  Demo at least 4+/5 of RTC muscle testing to demo improvement in tolerance to activity  The pt to tolerate lifting 50# overhead to improve tolerance to ADLs and work related activities   The pt will report full participation in ADLs and IADLs without restrictions related to R Shoulder.      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 Will complete HHD testing again in 2-3 weeks.     Supriya Mesa, PT

## 2024-07-10 ENCOUNTER — CLINICAL SUPPORT (OUTPATIENT)
Dept: REHABILITATION | Facility: HOSPITAL | Age: 34
End: 2024-07-10
Payer: OTHER GOVERNMENT

## 2024-07-10 DIAGNOSIS — M25.511 ACUTE PAIN OF RIGHT SHOULDER: Primary | ICD-10-CM

## 2024-07-10 DIAGNOSIS — M25.611 DECREASED RANGE OF MOTION OF RIGHT SHOULDER: ICD-10-CM

## 2024-07-10 PROCEDURE — 97112 NEUROMUSCULAR REEDUCATION: CPT

## 2024-07-10 PROCEDURE — 97530 THERAPEUTIC ACTIVITIES: CPT

## 2024-07-10 PROCEDURE — 97140 MANUAL THERAPY 1/> REGIONS: CPT

## 2024-07-15 NOTE — PROGRESS NOTES
[facility]   Physical Therapy Treatment Note     Name: Jasson Velasco  [facility] Number: [id number]    Therapy Diagnosis:   Encounter Diagnoses   Name Primary?    Acute pain of right shoulder Yes    Decreased range of motion of right shoulder        Physician: Willy Heller III, *    Visit Date: 7/10/2024    Physician Orders: PT Eval and Treat  Medical Diagnosis from Referral: M25.511 (ICD-10-CM) - Right shoulder pain, unspecified chronicity   Evaluation Date: 2/8/2024  Authorization Period Expiration: 5/24/2024  Plan of Care Expiration: 9/8/2024  Progress Note Due: 3/8/2024  Visit # / Visits authorized: 11/ 15        Time In:0913  Time Out: 1000  Total Billable Time: 48 minutes     DOS: 1/18/2024  S/p: 1. Shoulder arthroscopic posterior labral repair, capsulorrhaphy, 7:00 position (CPT 25055)  2. Shoulder arthroscopic SLAP type 8 repair (CPT 97595)  3. Shoulder arthroscopic extensive debridement (anterior, posterior glenohumeral joint, subacromial space) (CPT 88196)  4. Shoulder manipulation under anesthesia (CPT 69592)  5. Shoulder arthroscopic lysis of adhesions (CPT 68456)  6. Shoulder arthroscopic loose body removal  7. Shoulder arthroscopic distal clavicle excision (CPT 34004)  8. Shoulder arthroscopic subacromial decompression, bursectomy   Post-Op Precautions: We will follow the arthroscopic posterior labral repair with biceps tenodesis and SLAP repair guidelines. We discussed with the patient's family after surgery. The patient will remain in a sling for 6 weeks. We will start PT at the 2 week nery.         Precautions: Standard      SUBJECTIVE     Pt reports: he feels really bad in his shoulder, pain right at AC joint with any movement lincoln overhead     He was compliant with home exercise program.  Response to previous treatment: improvement in resting pain in shoulder joint   Functional change: na     Pain: 0/10  Location: right shoulder      OBJECTIVE     HHD for Upper Extremity  RTC   Right Left   Scaption 1 15.3 19.9                   2 14.8 18.9                   3 x 17.7   Average 15.05 18.8   LSI 80%   90-90 ER 1 25.6 x                   2 28.7 28.2                   3 23.0 22.4   Average 25.8 25.2   %   IR 90-90 1 31.8 25.1                  2 32.8 27.5                  3 33.4 27.6   Average 32.67 26.7   %     HHD Scap Muscles: unable to hold test position   Mid Trap 3/5 R; 3+/5 L   Lower Trap 3/5 R; 3+/5 L   Treatment   Extender used during Treatment to A with care    Jasson received the treatments listed below:      Manual therapy techniques: Joint mobilizations and Soft tissue Mobilization were applied to the: R shoulder for 10 minutes, including:  Leukotaping for AC mobility   ROM and joint mobility check       Neuromuscular re-education activities to improve: motor control for  38 minutes. The following activities were included:   Landmine 15# BB 20x   Landmine 45# 3x15   ABCs on wall 3x   RTC 90-90 3x15       Therapeutic activities to improve functional performance for 10  minutes, including:  UBE single arm 3m forward 3m backward   Education on mvoements for home     Patient Education and Home Exercises     Home Exercises Provided and Patient Education Provided     Education provided:   - cont to wear sling     Written Home Exercises Provided: Patient instructed to cont prior HEP. Exercises were reviewed and Jasson was able to demonstrate them prior to the end of the session.  Jasson demonstrated good  understanding of the education provided. See EMR under Patient Instructions for exercises provided during therapy sessions    ASSESSMENT     The patient with good response to AC taping due to sig hypermobility at the joint. The pt able to complete overhead movements and strengthening with reported less pain than without the tape.      Jasson Is progressing well towards his goals.     Pt prognosis is Good.     Pt will continue to benefit from skilled outpatient physical  therapy to address the deficits listed in the problem list box on initial evaluation, provide pt/family education and to maximize pt's level of independence in the home and community environment.     Pt's spiritual, cultural and educational needs considered and pt agreeable to plan of care and goals.     Anticipated barriers to physical therapy: AC Joint Injury the past    Goals:   Short Term Goals: 6 weeks  1. Pt will be compliant with HEP 50% of prescribed amount.   2. The pt to demo improvement in R shoulder PROM to by 80% of the L  3.  The pt to demo tolerance to being out of the sling for 24 hours with pain <2/10     Long Term Goals: 24 weeks   Pt will be compliant with % of prescribed amount.   The pt to improvement in AROM of R shoulder within 80% of L shoulder to improve tolerance to OH movement   The pt to  Demo at least 4+/5 of RTC muscle testing to demo improvement in tolerance to activity  The pt to tolerate lifting 50# overhead to improve tolerance to ADLs and work related activities   The pt will report full participation in ADLs and IADLs without restrictions related to R Shoulder.      PLAN     Focus on RTC and scap control     Supriya Mesa, PT

## 2024-07-24 ENCOUNTER — CLINICAL SUPPORT (OUTPATIENT)
Dept: REHABILITATION | Facility: HOSPITAL | Age: 34
End: 2024-07-24
Payer: OTHER GOVERNMENT

## 2024-07-24 DIAGNOSIS — M25.511 ACUTE PAIN OF RIGHT SHOULDER: Primary | ICD-10-CM

## 2024-07-24 DIAGNOSIS — M25.611 DECREASED RANGE OF MOTION OF RIGHT SHOULDER: ICD-10-CM

## 2024-07-24 PROCEDURE — 97112 NEUROMUSCULAR REEDUCATION: CPT | Mod: CQ

## 2024-07-24 PROCEDURE — 97530 THERAPEUTIC ACTIVITIES: CPT | Mod: CQ

## 2024-07-24 NOTE — PROGRESS NOTES
[facility]   Physical Therapy Treatment Note     Name: Jasson Velasco  [facility] Number: [id number]    Therapy Diagnosis:   Encounter Diagnoses   Name Primary?    Acute pain of right shoulder Yes    Decreased range of motion of right shoulder        Physician: Willy Heller III, *    Visit Date: 7/24/2024    Physician Orders: PT Eval and Treat  Medical Diagnosis from Referral: M25.511 (ICD-10-CM) - Right shoulder pain, unspecified chronicity   Evaluation Date: 2/8/2024  Authorization Period Expiration: 5/24/2024  Plan of Care Expiration: 9/8/2024  Progress Note Due: 3/8/2024  Visit # / Visits authorized: 14/ 15        Time In:0900  Time Out: 1000  Total Billable Time: 30 minutes     DOS: 1/18/2024  S/p: 1. Shoulder arthroscopic posterior labral repair, capsulorrhaphy, 7:00 position (CPT 33238)  2. Shoulder arthroscopic SLAP type 8 repair (CPT 69026)  3. Shoulder arthroscopic extensive debridement (anterior, posterior glenohumeral joint, subacromial space) (CPT 86472)  4. Shoulder manipulation under anesthesia (CPT 09894)  5. Shoulder arthroscopic lysis of adhesions (CPT 69210)  6. Shoulder arthroscopic loose body removal  7. Shoulder arthroscopic distal clavicle excision (CPT 11574)  8. Shoulder arthroscopic subacromial decompression, bursectomy   Post-Op Precautions: We will follow the arthroscopic posterior labral repair with biceps tenodesis and SLAP repair guidelines. We discussed with the patient's family after surgery. The patient will remain in a sling for 6 weeks. We will start PT at the 2 week nery.         Precautions: Standard      SUBJECTIVE     Pt reports: tape helps    He was compliant with home exercise program.  Response to previous treatment: improvement in resting pain in shoulder joint   Functional change: na     Pain: 0/10  Location: right shoulder      OBJECTIVE     HHD for Upper Extremity RTC   Right Left   Scaption 1 15.3 19.9                   2 14.8 18.9                    3 x 17.7   Average 15.05 18.8   LSI 80%   90-90 ER 1 25.6 x                   2 28.7 28.2                   3 23.0 22.4   Average 25.8 25.2   %   IR 90-90 1 31.8 25.1                  2 32.8 27.5                  3 33.4 27.6   Average 32.67 26.7   %     HHD Scap Muscles: unable to hold test position   Mid Trap 3/5 R; 3+/5 L   Lower Trap 3/5 R; 3+/5 L   Treatment     Jasson received the treatments listed below:      Manual therapy techniques: Joint mobilizations and Soft tissue Mobilization were applied to the: R shoulder for 10 minutes, including:  Leukotaping for AC mobility   ROM and joint mobility check       Neuromuscular re-education activities to improve: motor control for  42 minutes. The following activities were included:   Landmine 15# BB 20x   Landmine 45# 3x15   ABCs on wall 3x   RTC 90-90 3x15   Prone on SB T's 3x10  Prone on SB Y's 4x5    Therapeutic activities to improve functional performance for 08  minutes, including:  UBE single arm 4m forward 4m backward       Patient Education and Home Exercises     Home Exercises Provided and Patient Education Provided     Education provided:   - cont to wear sling     Written Home Exercises Provided: Patient instructed to cont prior HEP. Exercises were reviewed and Jasson was able to demonstrate them prior to the end of the session.  Jasson demonstrated good  understanding of the education provided. See EMR under Patient Instructions for exercises provided during therapy sessions    ASSESSMENT   Tape cont to help pt progress with exercises without AC joint pain. Needs to cont to work on MT and LT strengthening as this these muscle still show deficits in strength. Seeing Dr. Villalba soon about AC joint.      Jasson Is progressing well towards his goals.     Pt prognosis is Good.     Pt will continue to benefit from skilled outpatient physical therapy to address the deficits listed in the problem list box on initial evaluation, provide pt/family education  and to maximize pt's level of independence in the home and community environment.     Pt's spiritual, cultural and educational needs considered and pt agreeable to plan of care and goals.     Anticipated barriers to physical therapy: AC Joint Injury the past    Goals:   Short Term Goals: 6 weeks  1. Pt will be compliant with HEP 50% of prescribed amount.   2. The pt to demo improvement in R shoulder PROM to by 80% of the L  3.  The pt to demo tolerance to being out of the sling for 24 hours with pain <2/10     Long Term Goals: 24 weeks   Pt will be compliant with % of prescribed amount.   The pt to improvement in AROM of R shoulder within 80% of L shoulder to improve tolerance to OH movement   The pt to  Demo at least 4+/5 of RTC muscle testing to demo improvement in tolerance to activity  The pt to tolerate lifting 50# overhead to improve tolerance to ADLs and work related activities   The pt will report full participation in ADLs and IADLs without restrictions related to R Shoulder.      PLAN     Focus on RTC and scap control     Jailene Paige, PTA

## 2024-07-29 ENCOUNTER — OFFICE VISIT (OUTPATIENT)
Dept: SPORTS MEDICINE | Facility: CLINIC | Age: 34
End: 2024-07-29
Payer: OTHER GOVERNMENT

## 2024-07-29 VITALS
WEIGHT: 185.63 LBS | HEART RATE: 77 BPM | SYSTOLIC BLOOD PRESSURE: 127 MMHG | BODY MASS INDEX: 26.63 KG/M2 | DIASTOLIC BLOOD PRESSURE: 87 MMHG

## 2024-07-29 DIAGNOSIS — Z98.890 STATUS POST LABRAL REPAIR OF SHOULDER: Primary | ICD-10-CM

## 2024-07-29 PROCEDURE — 99999 PR PBB SHADOW E&M-EST. PATIENT-LVL III: CPT | Mod: PBBFAC,,, | Performed by: ORTHOPAEDIC SURGERY

## 2024-07-29 PROCEDURE — 99214 OFFICE O/P EST MOD 30 MIN: CPT | Mod: S$PBB,,, | Performed by: ORTHOPAEDIC SURGERY

## 2024-07-29 PROCEDURE — 99213 OFFICE O/P EST LOW 20 MIN: CPT | Mod: PBBFAC | Performed by: ORTHOPAEDIC SURGERY

## 2024-07-29 NOTE — PROGRESS NOTES
Chief Complaint: right shoulder pain    HISTORY OF PRESENT ILLNESS:   Pt is here today for post-operative followup of shoulder arthroscopy.  he is doing well.  We have reviewed patient's findings and discussed plan of care and future treatment options.      Tolerating pain in shoulder well. Denies any further instability events.   He continues to reports some pain and popping around his AC joint. He did have an old AC joint injury years ago and AC joint was popping and hurting prior to surgery also. He feels like his AC joint is impeding his progress with PT here at [place].     Pain at a 0/10 at rest  Pain with overhead activities 5/10    SANE 80           DATE OF PROCEDURE: 1/18/2024  SURGEON: Samia Villalba M.D.  OPERATION:   right  1. Shoulder arthroscopic posterior labral repair, capsulorrhaphy, 7:00 position (CPT 89380)  2. Shoulder arthroscopic SLAP type 8 repair (CPT 38885)  3. Shoulder arthroscopic extensive debridement (anterior, posterior glenohumeral joint, subacromial space) (CPT 99699)  4. Shoulder manipulation under anesthesia (CPT 94843)  5. Shoulder arthroscopic lysis of adhesions (CPT 82658)  6. Shoulder arthroscopic loose body removal  7. Shoulder arthroscopic distal clavicle excision (CPT 36950)  8. Shoulder arthroscopic subacromial decompression, bursectomy                                                                                   3 anchors were used in total.     Review of Systems   Constitution: Negative. Negative for chills, fever and night sweats.   HENT: Negative for congestion and headaches.    Eyes: Negative for blurred vision, left vision loss and right vision loss.   Cardiovascular: Negative for chest pain and syncope.   Respiratory: Negative for cough and shortness of breath.    Endocrine: Negative for polydipsia, polyphagia and polyuria.   Hematologic/Lymphatic: Negative for bleeding problem. Does not bruise/bleed easily.   Skin: Negative for dry skin, itching and rash.    Musculoskeletal: Negative for falls and muscle weakness.   Gastrointestinal: Negative for abdominal pain and bowel incontinence.   Genitourinary: Negative for bladder incontinence and nocturia.   Neurological: Negative for disturbances in coordination, loss of balance and seizures.   Psychiatric/Behavioral: Negative for depression. The patient does not have insomnia.    Allergic/Immunologic: Negative for hives and persistent infections.     PAST MEDICAL HISTORY:   Past Medical History:   Diagnosis Date    Biceps tendonitis on right 01/18/2024    SLAP lesion of right shoulder 01/18/2024     PAST SURGICAL HISTORY:   Past Surgical History:   Procedure Laterality Date    ARTHROSCOPIC DEBRIDEMENT OF SHOULDER Right 1/18/2024    Procedure: DEBRIDEMENT, ROTATOR CUFF, SHOULDER, ARTHROSCOPIC;  Surgeon: Samia Villalba MD;  Location: [facility] OR;  Service: Orthopedics;  Laterality: Right;    ARTHROSCOPY OF SHOULDER WITH DECOMPRESSION OF SUBACROMIAL SPACE Right 1/18/2024    Procedure: ARTHROSCOPY, SHOULDER, WITH SUBACROMIAL DECOMPRESSION;  Surgeon: Samia Villalba MD;  Location: [facility] OR;  Service: Orthopedics;  Laterality: Right;    COLONOSCOPY N/A 5/25/2023    Procedure: COLONOSCOPY;  Surgeon: Ronda Feliciano MD;  [facility];  Service: Colon and Rectal;  Laterality: N/A;    DISTAL CLAVICLE EXCISION Right 1/18/2024    Procedure: EXCISION, CLAVICLE, DISTAL;  Surgeon: Samia Villalba MD;  Location: [facility] OR;  Service: Orthopedics;  Laterality: Right;    REPAIR Right 1/18/2024    Procedure: INSTIBILITY REPAIR, ARTHROSCOPY SHOULDER;  Surgeon: Samia Villalba MD;  Location: [facility] OR;  Service: Orthopedics;  Laterality: Right;    SHOULDER ARTHROSCOPY W/ SUPERIOR LABRAL ANTERIOR POSTERIOR LESION REPAIR Right 1/18/2024    Procedure: ARTHROSCOPY, SHOULDER, WITH SLAP REPAIR;  Surgeon: Samia Villalba MD;  Location: [facility] OR;  Service: Orthopedics;  Laterality: Right;     FAMILY HISTORY: No family history on file.  SOCIAL HISTORY:   Social History      Socioeconomic History    Marital status:    Tobacco Use    Smoking status: Never    Smokeless tobacco: Never   Substance and Sexual Activity    Alcohol use: Not Currently    Drug use: Never    Sexual activity: Yes     Partners: Female       MEDICATIONS:   Current Outpatient Medications:     buPROPion (WELLBUTRIN XL) 300 MG 24 hr tablet, Do not drink alcohol.Take or use exactly as directed.Obtain advice for OTCs.May impair driving.Swallow whole.Check with your doctor before becoming pregnant., Disp: , Rfl:     cetirizine (ZYRTEC) 10 MG tablet, Take 10 mg by mouth daily as needed for Allergies., Disp: , Rfl:     albuterol (PROVENTIL/VENTOLIN HFA) 90 mcg/actuation inhaler, Inhale 1-2 puffs into the lungs every 6 (six) hours as needed. Rescue, Disp: 18 g, Rfl: 0    aspirin (ECOTRIN) 81 MG EC tablet, Take 1 tablet (81 mg total) by mouth once daily. For 4 weeks starting the day after surgery. (Patient not taking: Reported on 7/29/2024), Disp: 28 tablet, Rfl: 0    benzonatate (TESSALON) 100 MG capsule, Take 100 mg by mouth 3 (three) times daily as needed. (Patient not taking: Reported on 7/29/2024), Disp: , Rfl:     buPROPion (WELLBUTRIN XL) 150 MG TB24 tablet, Do not drink alcohol.Take or use exactly as directed.Obtain advice for OTCs.May impair driving.Swallow whole.Check with your doctor before becoming pregnant. (Patient not taking: Reported on 7/29/2024), Disp: , Rfl:     fluticasone propionate (FLONASE) 50 mcg/actuation nasal spray, 2 sprays by Each Nostril route daily as needed for Rhinitis. Shake Liquid and use (Patient not taking: Reported on 7/29/2024), Disp: , Rfl:     meloxicam (MOBIC) 15 MG tablet, Take 15 mg by mouth daily as needed. (Patient not taking: Reported on 7/29/2024), Disp: , Rfl:     meloxicam (MOBIC) 7.5 MG tablet, Take 1 tablet (7.5 mg total) by mouth once daily. (Patient not taking: Reported on 7/29/2024), Disp: 12 tablet, Rfl: 0    oxyCODONE-acetaminophen (PERCOCET)  mg per  tablet, Take 1 tablet by mouth every 4-6 hours as needed for pain. Take stool softener with this medication. (Patient not taking: Reported on 7/29/2024), Disp: 21 tablet, Rfl: 0    promethazine (PHENERGAN) 25 MG tablet, Take 1 tablet (25 mg total) by mouth every 6 (six) hours as needed for Nausea. (Patient not taking: Reported on 7/29/2024), Disp: 8 tablet, Rfl: 0    traMADoL (ULTRAM) 50 mg tablet, Take 1-2 tablets ( mg total) by mouth every 6 (six) hours as needed for Pain. (Patient not taking: Reported on 7/29/2024), Disp: 21 tablet, Rfl: 0    venlafaxine (EFFEXOR-XR) 37.5 MG 24 hr capsule, Take 37.5 mg by mouth once daily., Disp: , Rfl:   ALLERGIES: Review of patient's allergies indicates:  No Known Allergies    VITAL SIGNS: /87   Pulse 77   Wt 84.2 kg (185 lb 10 oz)   BMI 26.63 kg/m²      PHYSICAL EXAMINATION:  General:  The patient is alert and oriented x 3.  Mood is pleasant.  Observation of ears, eyes and nose reveal no gross abnormalities.  No labored breathing observed.  Gait is coordinated. Patient can toe walk and heel walk without difficulty.      right SHOULDER / UPPER EXTREMITY EXAM    OBSERVATION:     Swelling  none  Deformity  none   Discoloration  none   Scapular winging none   Scars   none  Atrophy  none    TENDERNESS / CREPITUS (T/C):          T/C      T/C   Clavicle   -/-  SUPRAspinatus    -/-     AC Jt.    +/-  INFRAspinatus  -/-    SC Jt.    -/-  Deltoid    -/-      G. Tuberosity  -/-  LH BICEP groove  +/-   Acromion:  -/-  Midline Neck   -/-     Scapular Spine -/-  Trapezium   -/-   SMA Scapula  -/-  GH jt. line - post  -/-     Scapulothoracic  -/-         ROM: (* = with pain)  Right shoulder   Left shoulder        AROM (PROM)   AROM (PROM)   FE    170° (175°)     170° (175°)     ER at 0°    60°  (65°)    60°  (65°)   ER at 90° ABD  90°  (90°)    90°  (90°)   IR at 90°  ABD   NA  (40°)     NA  (40°)      IR (spine level)   T10     T10    STRENGTH: (* = with pain) RIGHT  SHOULDER  LEFT SHOULDER   SCAPTION at 0  5/5    5/5   SCAPTION at 30  5/5    5/5    IR    5/5    5/5   ER    5/5    5/5   BICEPS   5/5    5/5   Deltoid    5/5    5/5     SIGNS:  Painful side       NEER   -    OJAGRUTIS  -    DIAL   -   SPEEDS  neg     DROP ARM   neg   BELLY PRESS neg   Superior escape none    LIFT-OFF  neg   X-Body ADD    neg    MOVING VALGUS neg        STABILITY TESTING    RIGHT SHOULDER   LEFT SHOULDER     Translation     Anterior  up face     up face    Posterior  up face    up face    Sulcus   < 10mm    < 10 mm     Signs   Apprehension   neg      neg       Relocation   no change     no change      Jerk test  neg     neg    EXTREMITY NEURO-VASCULAR EXAM    Sensation grossly intact to light touch all dermatomal regions.    DTR 2+ Biceps, Triceps, BR and Negative Josephs sign   Grossly intact motor function at Elbow, Wrist and Hand   Distal pulses radial and ulnar 2+, brisk cap refill, symmetric.      NECK:  Painless FROM and spinous processes non-tender. Negative Spurlings sign.      OTHER FINDINGS:  + scapular dyskinesia  2-3mm translation of the AC joint in the ant-posterior direction, similar to prior to surgery  Positive scapular assist testing  Medial Scapular winging                                                                                  ASSESSMENT:                                                                                                                                               1. Status post above, doing well.                                                                                                                               PLAN:                                                                                                                                                     1. Continue PT   2. Emphasized scapular function.  3. I have discussed return to activity in detail.  4. Patient will see us back in 3 months.                                       5. Discussed case with PT.    All questions were answered, surgical technique was reviewed and interpreted, and patient should contact us with any questions or concerns in the interim.

## 2024-07-31 ENCOUNTER — CLINICAL SUPPORT (OUTPATIENT)
Dept: REHABILITATION | Facility: HOSPITAL | Age: 34
End: 2024-07-31
Payer: OTHER GOVERNMENT

## 2024-07-31 DIAGNOSIS — M25.511 ACUTE PAIN OF RIGHT SHOULDER: Primary | ICD-10-CM

## 2024-07-31 DIAGNOSIS — M25.611 DECREASED RANGE OF MOTION OF RIGHT SHOULDER: ICD-10-CM

## 2024-07-31 PROCEDURE — 97112 NEUROMUSCULAR REEDUCATION: CPT

## 2024-07-31 PROCEDURE — 97530 THERAPEUTIC ACTIVITIES: CPT

## 2024-07-31 PROCEDURE — 97140 MANUAL THERAPY 1/> REGIONS: CPT

## 2024-10-30 ENCOUNTER — OFFICE VISIT (OUTPATIENT)
Dept: SPORTS MEDICINE | Facility: CLINIC | Age: 34
End: 2024-10-30
Payer: OTHER GOVERNMENT

## 2024-10-30 VITALS
DIASTOLIC BLOOD PRESSURE: 81 MMHG | HEART RATE: 69 BPM | WEIGHT: 194.44 LBS | BODY MASS INDEX: 27.84 KG/M2 | SYSTOLIC BLOOD PRESSURE: 127 MMHG | HEIGHT: 70 IN

## 2024-10-30 DIAGNOSIS — S43.101A ACROMIOCLAVICULAR JOINT SEPARATION, RIGHT, INITIAL ENCOUNTER: Primary | ICD-10-CM

## 2024-10-30 PROCEDURE — 99999 PR PBB SHADOW E&M-EST. PATIENT-LVL III: CPT | Mod: PBBFAC,,, | Performed by: ORTHOPAEDIC SURGERY

## 2024-10-30 PROCEDURE — 99213 OFFICE O/P EST LOW 20 MIN: CPT | Mod: PBBFAC | Performed by: ORTHOPAEDIC SURGERY

## 2024-10-30 PROCEDURE — 99214 OFFICE O/P EST MOD 30 MIN: CPT | Mod: S$PBB,,, | Performed by: ORTHOPAEDIC SURGERY

## 2024-11-13 DIAGNOSIS — S43.101A ACROMIOCLAVICULAR JOINT SEPARATION, RIGHT, INITIAL ENCOUNTER: Primary | ICD-10-CM

## 2024-12-18 ENCOUNTER — PATIENT MESSAGE (OUTPATIENT)
Dept: PREADMISSION TESTING | Facility: HOSPITAL | Age: 34
End: 2024-12-18
Payer: OTHER GOVERNMENT

## 2024-12-30 ENCOUNTER — OFFICE VISIT (OUTPATIENT)
Dept: SPORTS MEDICINE | Facility: CLINIC | Age: 34
End: 2024-12-30
Payer: OTHER GOVERNMENT

## 2024-12-30 VITALS
HEIGHT: 70 IN | DIASTOLIC BLOOD PRESSURE: 76 MMHG | BODY MASS INDEX: 27.84 KG/M2 | SYSTOLIC BLOOD PRESSURE: 113 MMHG | WEIGHT: 194.44 LBS | HEART RATE: 74 BPM

## 2024-12-30 DIAGNOSIS — S43.101A ACROMIOCLAVICULAR JOINT SEPARATION, RIGHT, INITIAL ENCOUNTER: Primary | ICD-10-CM

## 2024-12-30 PROCEDURE — 99499 UNLISTED E&M SERVICE: CPT | Mod: S$PBB,,, | Performed by: PHYSICIAN ASSISTANT

## 2024-12-30 PROCEDURE — 99999 PR PBB SHADOW E&M-EST. PATIENT-LVL IV: CPT | Mod: PBBFAC,,, | Performed by: PHYSICIAN ASSISTANT

## 2024-12-30 PROCEDURE — 99214 OFFICE O/P EST MOD 30 MIN: CPT | Mod: PBBFAC | Performed by: PHYSICIAN ASSISTANT

## 2024-12-30 RX ORDER — SILDENAFIL 50 MG/1
50 TABLET, FILM COATED ORAL DAILY PRN
COMMUNITY

## 2024-12-31 ENCOUNTER — TELEPHONE (OUTPATIENT)
Dept: SPORTS MEDICINE | Facility: CLINIC | Age: 34
End: 2024-12-31
Payer: OTHER GOVERNMENT

## 2024-12-31 RX ORDER — DOCUSATE SODIUM 100 MG/1
100 CAPSULE, LIQUID FILLED ORAL 2 TIMES DAILY PRN
Qty: 14 CAPSULE | Refills: 0 | Status: SHIPPED | OUTPATIENT
Start: 2024-12-31

## 2024-12-31 RX ORDER — ASPIRIN 81 MG/1
81 TABLET ORAL DAILY
COMMUNITY
Start: 2024-12-31 | End: 2025-12-31

## 2024-12-31 RX ORDER — TRAMADOL HYDROCHLORIDE 50 MG/1
50-100 TABLET ORAL EVERY 6 HOURS PRN
Qty: 21 TABLET | Refills: 0 | Status: SHIPPED | OUTPATIENT
Start: 2024-12-31

## 2024-12-31 RX ORDER — PROMETHAZINE HYDROCHLORIDE 25 MG/1
25 TABLET ORAL EVERY 6 HOURS PRN
Qty: 10 TABLET | Refills: 0 | Status: SHIPPED | OUTPATIENT
Start: 2024-12-31

## 2024-12-31 RX ORDER — OXYCODONE AND ACETAMINOPHEN 10; 325 MG/1; MG/1
TABLET ORAL
Qty: 21 TABLET | Refills: 0 | Status: SHIPPED | OUTPATIENT
Start: 2024-12-31

## 2024-12-31 NOTE — TELEPHONE ENCOUNTER
I spoke with the patient and let him know that because we do not have authorization we will not be able to proceed with surgery on Thursday. He notes seeing his PCM on 12/23/24 and the referral was supposed to be placed but when he calls  to check in today their system is down. I let him know that our pre service department indicated today that the PCM referral was still not reflecting when they checked earlier today.     He will reach out should he hear something later today or tomorrow but that at this time we will have to cancel surgery and he will reach out to reschedule since he will be unable to have surgery in the near future if not this week

## 2024-12-31 NOTE — TELEPHONE ENCOUNTER
----- Message from [place] sent at 12/31/2024  2:43 PM CST -----  Regarding: Appt  Contact: pt  [phone]  Pt is requesting call back to discuss plan of care, pt is calling to obtain arrival time for surgery 1/2/25, please call pt @[phone]

## 2025-01-01 RX ORDER — SODIUM CHLORIDE 9 MG/ML
INJECTION, SOLUTION INTRAVENOUS CONTINUOUS
OUTPATIENT
Start: 2025-01-01

## 2025-01-01 RX ORDER — PREGABALIN 75 MG/1
75 CAPSULE ORAL
OUTPATIENT
Start: 2025-01-01 | End: 2025-01-01

## 2025-01-01 RX ORDER — OXYCODONE HCL 10 MG/1
10 TABLET, FILM COATED, EXTENDED RELEASE ORAL
OUTPATIENT
Start: 2025-01-01 | End: 2025-01-01

## 2025-01-01 RX ORDER — CLINDAMYCIN PHOSPHATE 900 MG/50ML
900 INJECTION, SOLUTION INTRAVENOUS
OUTPATIENT
Start: 2025-01-01

## 2025-01-02 NOTE — H&P
Jasson Velasco  is here for a completion of his perioperative paperwork. he  Is scheduled to undergo     right  a. Open reduction internal fixation of Right AC joint separation w hook plate  b. AC joint reconstruction with posterior tib allograft on 1/2/25.      He is a healthy individual and does not need clearance for this procedure.     PAST MEDICAL HISTORY:   Past Medical History:   Diagnosis Date    Biceps tendonitis on right 01/18/2024    SLAP lesion of right shoulder 01/18/2024     PAST SURGICAL HISTORY:   Past Surgical History:   Procedure Laterality Date    ARTHROSCOPIC DEBRIDEMENT OF SHOULDER Right 1/18/2024    Procedure: DEBRIDEMENT, ROTATOR CUFF, SHOULDER, ARTHROSCOPIC;  Surgeon: Samia Villalba MD;  Location: [facility] OR;  Service: Orthopedics;  Laterality: Right;    ARTHROSCOPY OF SHOULDER WITH DECOMPRESSION OF SUBACROMIAL SPACE Right 1/18/2024    Procedure: ARTHROSCOPY, SHOULDER, WITH SUBACROMIAL DECOMPRESSION;  Surgeon: Samia Villalba MD;  Location: [facility] OR;  Service: Orthopedics;  Laterality: Right;    COLONOSCOPY N/A 5/25/2023    Procedure: COLONOSCOPY;  Surgeon: Ronda Feliciano MD;  Location: [facility];  Service: Colon and Rectal;  Laterality: N/A;    DISTAL CLAVICLE EXCISION Right 1/18/2024    Procedure: EXCISION, CLAVICLE, DISTAL;  Surgeon: Samia Villalba MD;  Location: [facility] OR;  Service: Orthopedics;  Laterality: Right;    REPAIR Right 1/18/2024    Procedure: INSTIBILITY REPAIR, ARTHROSCOPY SHOULDER;  Surgeon: Samia Villalba MD;  Location: [facility] OR;  Service: Orthopedics;  Laterality: Right;    SHOULDER ARTHROSCOPY W/ SUPERIOR LABRAL ANTERIOR POSTERIOR LESION REPAIR Right 1/18/2024    Procedure: ARTHROSCOPY, SHOULDER, WITH SLAP REPAIR;  Surgeon: Samia Villalba MD;  Location: [facility] OR;  Service: Orthopedics;  Laterality: Right;     FAMILY HISTORY: No family history on file.  SOCIAL HISTORY:   Social History     Socioeconomic History    Marital status:    Tobacco Use    Smoking status: Never    Smokeless  tobacco: Never   Substance and Sexual Activity    Alcohol use: Not Currently    Drug use: Never    Sexual activity: Yes     Partners: Female       MEDICATIONS:   Current Outpatient Medications:     meloxicam (MOBIC) 15 MG tablet, Take 15 mg by mouth daily as needed., Disp: , Rfl:     sildenafiL (VIAGRA) 50 MG tablet, Take 50 mg by mouth daily as needed for Erectile Dysfunction., Disp: , Rfl:     albuterol (PROVENTIL/VENTOLIN HFA) 90 mcg/actuation inhaler, Inhale 1-2 puffs into the lungs every 6 (six) hours as needed. Rescue, Disp: 18 g, Rfl: 0    aspirin (ECOTRIN) 81 MG EC tablet, Take 1 tablet (81 mg total) by mouth once daily. For 4 weeks starting the day after surgery., Disp: , Rfl:     benzonatate (TESSALON) 100 MG capsule, Take 100 mg by mouth 3 (three) times daily as needed. (Patient not taking: Reported on 7/29/2024), Disp: , Rfl:     buPROPion (WELLBUTRIN XL) 150 MG TB24 tablet, Do not drink alcohol.Take or use exactly as directed.Obtain advice for OTCs.May impair driving.Swallow whole.Check with your doctor before becoming pregnant. (Patient not taking: Reported on 10/30/2024), Disp: , Rfl:     cetirizine (ZYRTEC) 10 MG tablet, Take 10 mg by mouth daily as needed for Allergies. (Patient not taking: Reported on 12/30/2024), Disp: , Rfl:     docusate sodium (COLACE) 100 MG capsule, Take 1 capsule (100 mg total) by mouth 2 (two) times daily as needed for Constipation., Disp: 14 capsule, Rfl: 0    fluticasone propionate (FLONASE) 50 mcg/actuation nasal spray, 2 sprays by Each Nostril route daily as needed for Rhinitis. Shake Liquid and use (Patient not taking: Reported on 7/29/2024), Disp: , Rfl:     meloxicam (MOBIC) 7.5 MG tablet, Take 1 tablet (7.5 mg total) by mouth once daily. (Patient not taking: Reported on 7/29/2024), Disp: 12 tablet, Rfl: 0    oxyCODONE-acetaminophen (PERCOCET)  mg per tablet, Take 1 tablet by mouth every 4-6 hours as needed for pain. Take stool softener with this medication.,  "Disp: 21 tablet, Rfl: 0    promethazine (PHENERGAN) 25 MG tablet, Take 1 tablet (25 mg total) by mouth every 6 (six) hours as needed for Nausea., Disp: 10 tablet, Rfl: 0    traMADoL (ULTRAM) 50 mg tablet, Take 1-2 tablets ( mg total) by mouth every 6 (six) hours as needed for Pain., Disp: 21 tablet, Rfl: 0    venlafaxine (EFFEXOR-XR) 37.5 MG 24 hr capsule, Take 37.5 mg by mouth once daily., Disp: , Rfl:   ALLERGIES: Review of patient's allergies indicates:  No Known Allergies    VITAL SIGNS: /76   Pulse 74   Ht 5' 10" (1.778 m)   Wt 88.2 kg (194 lb 7.1 oz)   BMI 27.90 kg/m²      Risks, indications and benefits of the surgical procedure were discussed with the patient. All questions with regard to surgery, rehab, expected return to functional activities, activities of daily living and recreational endeavors were answered to his satisfaction.    It was explained to the patient that there may be an increase in surgical risks if the patient has certain co-morbidities such as but not limited to: Obesity, Cardiovascular issues (CHF, CAD, Arrhythmias), chronic pulmonary issues, previous or current neurovascular/neurological issues, previous strokes, diabetes mellitus, previous wound healing issues, previous wound or skin infections, PVD, clotting disorders, if the patient uses chronic steroids, if the patient takes or has immune compromising medications or diseases, or has previously or currently used tobacco products.     The patient verbalized that he/she does not have any additional clotting, bleeding, or blood disorders, other than what is list in her chart on today's review.     Then a brief history and physical exam were performed.    Review of Systems   Constitution: Negative. Negative for chills, fever and night sweats.   HENT: Negative for congestion and headaches.    Eyes: Negative for blurred vision, left vision loss and right vision loss.   Cardiovascular: Negative for chest pain and syncope. "   Respiratory: Negative for cough and shortness of breath.    Endocrine: Negative for polydipsia, polyphagia and polyuria.   Hematologic/Lymphatic: Negative for bleeding problem. Does not bruise/bleed easily.   Skin: Negative for dry skin, itching and rash.   Musculoskeletal: Negative for falls and muscle weakness.   Gastrointestinal: Negative for abdominal pain and bowel incontinence.   Genitourinary: Negative for bladder incontinence and nocturia.   Neurological: Negative for disturbances in coordination, loss of balance and seizures.   Psychiatric/Behavioral: Negative for depression. The patient does not have insomnia.    Allergic/Immunologic: Negative for hives and persistent infections.     PHYSICAL EXAM:  GEN: A&Ox3, WD WN NAD  HEENT: WNL  CHEST: CTAB, no W/R/R  HEART: RRR, no M/R/G  ABD: Soft, NT ND, BS x4 QUADS  MS; See Epic  NEURO: CN II-XII intact       The surgical consent was then reviewed with the patient, who agreed with all the contents of the consent form and it was signed. he was then given the surgery packet to bring with him to surgery center for the anesthesia portion of his perioperative paperwork (if needed)   For all physicians except for Dr. Hobson, we will email and possibly fax the consent forms and booking sheets to [facility].    The patient was given the opportunity to ask questions about the surgical plan and consent form, and once no other questions were asked, I proceeded with the pre-op appointment.    PHYSICAL THERAPY:  He was also instructed regarding physical therapy and will begin on 3 weeks post-op. He was given a copy of the original prescription to schedule. Another copy of this prescription was also faxed to [facility].    POST OP CARE:instructions were reviewed including care of the wound and dressing after surgery and when he can shower. Patient was told not sleep or lay on there surgical extremity following surgery as this could cause repair damage, tissue  damage, or nerve injury.    An extensive amount of time was spent on discussion of the following information based on what type of surgery the patient was having. Patient expressed understanding of the material below:    Shoulder surgery or upper extremity surgery requiring post-op sling:  It was explained to the patient that they should remove their arm from the sling approximately 6 times per day to do full elbow ROM (flexion and extension) and full supination and pronation of the elbow for approximately 5 minutes at a time to help prevent elbow stiffness, nerve pain or problems, or nerve injury. They were told to contact us if they begin having numbness and tingling of there surgical extremity that persists longer then 1 day without relief.     Extremity surgery requiring a splint:   It was explained to patient on how to properly elevated position there extremity to prevent pressure ulcers from occurring. I made sure that the patient understood that that surgical site may be numb following surgery and prevent them from feeling pressure pain that they would normal feel if a pressure injury was occurring. Pressure ulcers and there causes were discussed with the patient today.     Post-operative splint:  It was explain to the patient that they can contact us at anytime if they feel that there is a problem with their splint or under their splint that needs evaluation. If there is concern, questions, or discomfort with the splint then they can present to either our clinic or the [facility] ED for removal, evaluation, and replacement of the splint.    CRUTCHES OR WALKER: It was explained to the patient that if they are having a lower extremity surgery that they will require either a walker or crutches to ambulate safely with after surgery. It was explained that a cane or other assistive devices are not sufficient to safely ambulate with after surgery. I explained to the patient that I will place an order for  them to receive either crutches or a walker after surgery to go home with. It was explained that if they have crutches or a walker at home already, that they are REQUIRED to bring them to the hospital on the day of surgery. It was explained that if they do not have them at the hospital on the day of surgery that they WILL be provided a new pair or crutches or a walker to go home with to ensure ambulation will be safe if the patient needs to stop somewhere on the way home.      If the patient's mobility limitation cannot be sufficiently resolved by the use of crutches then it is necessary for the patient's functional mobility deficit to be sufficiently resolved with the use of a (Rolling Walker or Walker). Patient's mobility limitation significantly impairs their ability to participate in one of more activities of daily living. The use of a (Rolling Walker or Walker) will significantly improve the patient's ability to participate in [facility]ADLS and the patient will use it on regular basis in the home.      PAIN MANAGEMENT: Jasson Velasco was also given a pain management regime, which includes the option of getting a TENS unit given to him by [facility] (if patient chooses) along with the education required for its use. He was also instructed regarding the Polar ice unit or gel ice packs (chosen by patient) that will be in place after surgery and his postoperative pain medications.     Patient understands that Polar Ice machine has to be bought today if they want it. It cannot be bought on day of surgery at surgery center.     PAIN MEDICATION:  Percocet 10/325mg 1 po q 4-6 hours prn pain  Ultram 50 mg Take 1-2 p.o. q.6 hours p.r.n. breakthrough pain,   Phenergan 25 mg one p.o. q.6 hours p.r.n. nausea and vomiting.    DVT prophylaxis was discussed with the patient today including risk factors for developing DVTs and history of DVTs. The patient was asked if any specific recommendations were given from the doctor/s that did  pre-operative surgical clearance. The patient was then given an education sheet about DVTs and PE with warning signs and symptoms of both and steps to take if they suspect either of these.    This along with the Modified Caprini risk assessment model for VTE in general surgical patients was used to determine the patient's DVT risk.     From: Nadja SENIOR, Sinan DA, Glory RODRIGUEZ, et al. Prevention of VTE in nonorthopedic surgical patients: antithrombotic therapy and prevention of thrombosis, 9th ed: American College [employer] evidence-based clinical practical guidelines. Chest 2012; 141:e227S. Copyright © 2012. Reproduced with permission from the American College of Chest Physicians.    The below listed DVT prophylaxis regimen along with bilateral JENNIFER compression stockings will be used post-op. Length of treatment has been determined to be 10-42 days post-op by the above noted Caprini assessment model.     The patient was instructed to buy and take:  Aspirin 81mg QD x 4 weeks for DVT prophylaxis starting on the morning after surgery.  Patient will also use bilateral TEDs on lower extremities, SCDs during surgery, and early ambulation post-op. If the patient was previously taking 81mg baby aspirin, they were told to not take it starting 5 days prior to surgery and to restart the 81mg aspirin after surgery.       Patient was also told to buy over the counter Prilosec medication if needed and take it once daily for GI protection as long as they are taking NSAIDs or Aspirin.   Patient verbally denies history of blood clots or DVTs when asked today.   Patient denies history of seizures.      The patient was told that narcotic pain medications may make them drowsy and instructions were given to not sign legal documents, drive or operate heavy machinery, cars, or equipment while under the influence of narcotic medications. The patient was told and understands that narcotic pain medications should only be used as needed to  control pain and that other options of pain control include TENs unit and ice packs/unit.     As there were no other questions to be asked, he was given my business card along with Samia Villalba MD business card if he has any questions or concerns prior to surgery or in the postop period.

## 2025-07-01 ENCOUNTER — HOSPITAL ENCOUNTER (EMERGENCY)
Facility: HOSPITAL | Age: 35
Discharge: HOME | End: 2025-07-01
Attending: EMERGENCY MEDICINE
Payer: OTHER GOVERNMENT

## 2025-07-01 VITALS
DIASTOLIC BLOOD PRESSURE: 75 MMHG | TEMPERATURE: 98 F | HEIGHT: 70 IN | WEIGHT: 195 LBS | OXYGEN SATURATION: 97 % | BODY MASS INDEX: 27.92 KG/M2 | RESPIRATION RATE: 18 BRPM | SYSTOLIC BLOOD PRESSURE: 129 MMHG | HEART RATE: 67 BPM

## 2025-07-01 DIAGNOSIS — K80.20 SYMPTOMATIC CHOLELITHIASIS: Primary | ICD-10-CM

## 2025-07-01 LAB
ABSOLUTE EOSINOPHIL (OHS): 0.15 K/UL
ABSOLUTE MONOCYTE (OHS): 0.57 K/UL (ref 0.3–1)
ABSOLUTE NEUTROPHIL COUNT (OHS): 5.56 K/UL (ref 1.8–7.7)
ALBUMIN SERPL BCP-MCNC: 4.4 G/DL (ref 3.5–5.2)
ALP SERPL-CCNC: 90 UNIT/L (ref 40–150)
ALT SERPL W/O P-5'-P-CCNC: 26 UNIT/L (ref 10–44)
ANION GAP (OHS): 11 MMOL/L (ref 8–16)
AST SERPL-CCNC: 26 UNIT/L (ref 11–45)
BACTERIA #/AREA URNS AUTO: ABNORMAL /HPF
BASOPHILS # BLD AUTO: 0.08 K/UL
BASOPHILS NFR BLD AUTO: 0.9 %
BILIRUB SERPL-MCNC: 1 MG/DL (ref 0.1–1)
BILIRUB UR QL STRIP.AUTO: NEGATIVE
BUN SERPL-MCNC: 17 MG/DL (ref 6–20)
CALCIUM SERPL-MCNC: 9.7 MG/DL (ref 8.7–10.5)
CHLORIDE SERPL-SCNC: 106 MMOL/L (ref 95–110)
CLARITY UR: CLEAR
CO2 SERPL-SCNC: 23 MMOL/L (ref 23–29)
COLOR UR AUTO: YELLOW
CREAT SERPL-MCNC: 1.1 MG/DL (ref 0.5–1.4)
ERYTHROCYTE [DISTWIDTH] IN BLOOD BY AUTOMATED COUNT: 13.2 % (ref 11.5–14.5)
GFR SERPLBLD CREATININE-BSD FMLA CKD-EPI: >60 ML/MIN/1.73/M2
GLUCOSE SERPL-MCNC: 83 MG/DL (ref 70–110)
GLUCOSE UR QL STRIP: NEGATIVE
HCT VFR BLD AUTO: 42 % (ref 40–54)
HGB BLD-MCNC: 13.9 GM/DL (ref 14–18)
HGB UR QL STRIP: NEGATIVE
IMM GRANULOCYTES # BLD AUTO: 0.01 K/UL (ref 0–0.04)
IMM GRANULOCYTES NFR BLD AUTO: 0.1 % (ref 0–0.5)
KETONES UR QL STRIP: NEGATIVE
LEUKOCYTE ESTERASE UR QL STRIP: ABNORMAL
LIPASE SERPL-CCNC: 20 U/L (ref 4–60)
LYMPHOCYTES # BLD AUTO: 2.12 K/UL (ref 1–4.8)
MCH RBC QN AUTO: 29.1 PG (ref 27–31)
MCHC RBC AUTO-ENTMCNC: 33.1 G/DL (ref 32–36)
MCV RBC AUTO: 88 FL (ref 82–98)
MICROSCOPIC COMMENT: ABNORMAL
NITRITE UR QL STRIP: NEGATIVE
NUCLEATED RBC (/100WBC) (OHS): 0 /100 WBC
PH UR STRIP: 6 [PH]
PLATELET # BLD AUTO: 383 K/UL (ref 150–450)
PMV BLD AUTO: 9.5 FL (ref 9.2–12.9)
POTASSIUM SERPL-SCNC: 4.2 MMOL/L (ref 3.5–5.1)
PROT SERPL-MCNC: 8.3 GM/DL (ref 6–8.4)
PROT UR QL STRIP: ABNORMAL
RBC # BLD AUTO: 4.77 M/UL (ref 4.6–6.2)
RBC #/AREA URNS AUTO: 4 /HPF (ref 0–4)
RELATIVE EOSINOPHIL (OHS): 1.8 %
RELATIVE LYMPHOCYTE (OHS): 25 % (ref 18–48)
RELATIVE MONOCYTE (OHS): 6.7 % (ref 4–15)
RELATIVE NEUTROPHIL (OHS): 65.5 % (ref 38–73)
SODIUM SERPL-SCNC: 140 MMOL/L (ref 136–145)
SP GR UR STRIP: 1.03
UROBILINOGEN UR STRIP-ACNC: NEGATIVE EU/DL
WBC # BLD AUTO: 8.49 K/UL (ref 3.9–12.7)
WBC #/AREA URNS AUTO: 8 /HPF (ref 0–5)

## 2025-07-01 PROCEDURE — 96374 THER/PROPH/DIAG INJ IV PUSH: CPT

## 2025-07-01 PROCEDURE — 99285 EMERGENCY DEPT VISIT HI MDM: CPT | Mod: 25

## 2025-07-01 PROCEDURE — 63600175 PHARM REV CODE 636 W HCPCS

## 2025-07-01 PROCEDURE — 85025 COMPLETE CBC W/AUTO DIFF WBC: CPT

## 2025-07-01 PROCEDURE — 25000003 PHARM REV CODE 250

## 2025-07-01 PROCEDURE — 96361 HYDRATE IV INFUSION ADD-ON: CPT

## 2025-07-01 PROCEDURE — 83690 ASSAY OF LIPASE: CPT

## 2025-07-01 PROCEDURE — 84075 ASSAY ALKALINE PHOSPHATASE: CPT

## 2025-07-01 PROCEDURE — 81003 URINALYSIS AUTO W/O SCOPE: CPT

## 2025-07-01 PROCEDURE — 82040 ASSAY OF SERUM ALBUMIN: CPT

## 2025-07-01 PROCEDURE — 96375 TX/PRO/DX INJ NEW DRUG ADDON: CPT

## 2025-07-01 RX ORDER — KETOROLAC TROMETHAMINE 30 MG/ML
15 INJECTION, SOLUTION INTRAMUSCULAR; INTRAVENOUS
Status: COMPLETED | OUTPATIENT
Start: 2025-07-01 | End: 2025-07-01

## 2025-07-01 RX ORDER — OXYCODONE AND ACETAMINOPHEN 5; 325 MG/1; MG/1
1 TABLET ORAL
Refills: 0 | Status: COMPLETED | OUTPATIENT
Start: 2025-07-01 | End: 2025-07-01

## 2025-07-01 RX ORDER — ONDANSETRON HYDROCHLORIDE 2 MG/ML
4 INJECTION, SOLUTION INTRAVENOUS
Status: COMPLETED | OUTPATIENT
Start: 2025-07-01 | End: 2025-07-01

## 2025-07-01 RX ORDER — ONDANSETRON 4 MG/1
4 TABLET, ORALLY DISINTEGRATING ORAL 3 TIMES DAILY PRN
Qty: 20 TABLET | Refills: 0 | Status: SHIPPED | OUTPATIENT
Start: 2025-07-01 | End: 2025-07-02 | Stop reason: CLARIF

## 2025-07-01 RX ORDER — OXYCODONE AND ACETAMINOPHEN 5; 325 MG/1; MG/1
1 TABLET ORAL EVERY 6 HOURS PRN
Qty: 10 EACH | Refills: 0 | Status: SHIPPED | OUTPATIENT
Start: 2025-07-01 | End: 2025-07-02 | Stop reason: CLARIF

## 2025-07-01 RX ADMIN — KETOROLAC TROMETHAMINE 15 MG: 30 INJECTION, SOLUTION INTRAMUSCULAR; INTRAVENOUS at 05:07

## 2025-07-01 RX ADMIN — SODIUM CHLORIDE 1000 ML: 9 INJECTION, SOLUTION INTRAVENOUS at 03:07

## 2025-07-01 RX ADMIN — OXYCODONE HYDROCHLORIDE AND ACETAMINOPHEN 1 TABLET: 5; 325 TABLET ORAL at 05:07

## 2025-07-01 RX ADMIN — ONDANSETRON 4 MG: 2 INJECTION INTRAMUSCULAR; INTRAVENOUS at 05:07

## 2025-07-01 NOTE — DISCHARGE INSTRUCTIONS
It is important that you follow up at your scheduled appointment tomorrow with Dr. Curtis.    Drink lots of fluids, stay well hydrated. Alternate Tylenol/Ibuprofen as needed for pain/ fever; go back and forth between these two medications every 4 hrs as needed for temp greater than or equal to 100.4F.  You may take 800 ibuprofen and 500 to a 1000 mg of Tylenol at 1 time.  Maximum dose of Tylenol and 1 day is 3000 mg in the maximum dose of ibuprofen and 1 day is 1200mg.   If pain persists/ continues to be intolerable, you were prescribed a short course of narcotic pain medication called Percocet.  Uses medications sparingly for severe, intolerable pain.  Take Zofran as needed for nausea.  Placed under your tongue 30 minutes prior to taking medication to prevent nausea from occurring.    Please return to ER if you experience severe intolerable pain, severe dizziness, fever higher then 100.4 that persist after medication administration, uncontrolled nausea/vomiting or diarrhea or any other major concern like increased pain, chest pain, shortness of breath, inability to pass stool or gas, or difficulty breathing or swelling of the throat/mouth/tongue.    Be sure to follow up with your primary care doctor and review all labs/imaging/tests that were performed during your ER visit with them. It is very common for us to identify non-emergent incidental findings which must be followed up with your primary care physician.  Some labs/imaging/tests may be outside of the normal range, and require non-emergent follow-up and/or further investigation/treatment/procedures/testing to help diagnose/exclude/prevent complications or other potentially serious medical conditions. Some abnormalities may not have been discussed or addressed during your ER visit.

## 2025-07-01 NOTE — ED PROVIDER NOTES
Encounter Date: 7/1/2025       History     Chief Complaint   Patient presents with    Abdominal Pain     Pt presents to ER after seeing his PCP today and was told his gall bladder may need to be removed. Pt reports right upper quad abdominal pain times 1 month off and on and the last week consistent. Pt rates pain 3/10 but says it does get worse. Pt denies any chest pain, SOB or any other issues at this time.      35 y.o. male with no chronic PMHx presents for emergent evaluation of RUQ abdominal pain X 1 month.  He states that the pain has been intermittent over the last month but worsened over the last week becoming constant.  He describes it as a constant ache/ tightness with intermittent sharp stabbing pains.  He denies any radiation of the pain to his back or testicles.  He states it has not worsened with PO intake in his had a normal appetite. His last meal was last night. He does note pain worsened with certain movements and with bearing down. Denies any abdominal distention.  Reports normal daily bowel movements.  Denies any decreased urine output, testicle pain, testicle swelling, hematuria or dysuria.  No prior abdominal surgeries.  No attempted treatment for his symptoms. Patient denies fever/chills, headache, chest pain, shortness of breath, nausea/vomiting/diarrhea, urinary concerns, myalgias, or any other complaints at this time.     The history is provided by the patient.     Review of patient's allergies indicates:  No Known Allergies  Past Medical History:   Diagnosis Date    Biceps tendonitis on right 01/18/2024    SLAP lesion of right shoulder 01/18/2024     Past Surgical History:   Procedure Laterality Date    ARTHROSCOPIC DEBRIDEMENT OF SHOULDER Right 1/18/2024    Procedure: DEBRIDEMENT, ROTATOR CUFF, SHOULDER, ARTHROSCOPIC;  Surgeon: Samia Villalba MD;  Location: [facility];  Service: Orthopedics;  Laterality: Right;    ARTHROSCOPY OF SHOULDER WITH DECOMPRESSION OF SUBACROMIAL SPACE Right 1/18/2024     Procedure: ARTHROSCOPY, SHOULDER, WITH SUBACROMIAL DECOMPRESSION;  Surgeon: Samia Villalba MD;  Location: [facility] OR;  Service: Orthopedics;  Laterality: Right;    COLONOSCOPY N/A 5/25/2023    Procedure: COLONOSCOPY;  Surgeon: Ronda Feliciano MD;  Location: [facility];  Service: Colon and Rectal;  Laterality: N/A;    DISTAL CLAVICLE EXCISION Right 1/18/2024    Procedure: EXCISION, CLAVICLE, DISTAL;  Surgeon: Samia Villalba MD;  Location: [facility] OR;  Service: Orthopedics;  Laterality: Right;    REPAIR Right 1/18/2024    Procedure: INSTIBILITY REPAIR, ARTHROSCOPY SHOULDER;  Surgeon: Samia Villalba MD;  Location: [facility] OR;  Service: Orthopedics;  Laterality: Right;    SHOULDER ARTHROSCOPY W/ SUPERIOR LABRAL ANTERIOR POSTERIOR LESION REPAIR Right 1/18/2024    Procedure: ARTHROSCOPY, SHOULDER, WITH SLAP REPAIR;  Surgeon: Samia Villalba MD;  Location: [facility] OR;  Service: Orthopedics;  Laterality: Right;     No family history on file.  Social History[1]  Review of Systems   Constitutional:  Negative for chills and fever.   HENT:  Negative for sore throat.    Respiratory:  Negative for shortness of breath.    Cardiovascular:  Negative for chest pain.   Gastrointestinal:  Positive for abdominal pain. Negative for abdominal distention, diarrhea, nausea and vomiting.   Genitourinary:  Negative for decreased urine volume, dysuria, hematuria and testicular pain.   Musculoskeletal:  Negative for myalgias.   Skin:  Negative for rash.   Neurological:  Negative for weakness and headaches.   Psychiatric/Behavioral: Negative.         Physical Exam     Initial Vitals [07/01/25 1451]   BP Pulse Resp Temp SpO2   130/77 72 18 98.2 °F (36.8 °C) 95 %      MAP       --         Physical Exam    Nursing note and vitals reviewed.  Constitutional: He appears well-developed and well-nourished. He is not diaphoretic. No distress.   HENT:   Head: Normocephalic and atraumatic.   Right Ear: External ear normal.   Left Ear: External ear normal.   Eyes: Conjunctivae  and EOM are normal. No scleral icterus.   Neck: Neck supple.   Normal range of motion.  Cardiovascular:  Normal rate and intact distal pulses.           Pulmonary/Chest: No stridor. No respiratory distress.   Abdominal: Abdomen is soft.   Exam significant for RUQ abdominal TTP.     Otherwise, abdomen is nondistended, soft and nontender in all other quadrants. Normoactive bowel sounds. Nonperitoneal, no guarding or rigidity.  Negative rebound tenderness.  No palpable masses or hepatosplenomegaly.  No suprapubic pain. No CVAT.  No overlying skin changes. Distal pulses 2+ b/l.     Musculoskeletal:         General: Normal range of motion.      Cervical back: Normal range of motion and neck supple.      Comments: No extremity edema or cyanosis.  Ambulatory     Lymphadenopathy:     He has no cervical adenopathy.   Neurological: He is alert and oriented to person, place, and time. He has normal strength.   Skin: No rash noted.   Psychiatric: He has a normal mood and affect. Thought content normal.         ED Course   Procedures  Labs Reviewed   URINALYSIS, REFLEX TO URINE CULTURE - Abnormal       Result Value    Color, UA Yellow      Appearance, UA Clear      pH, UA 6.0      Spec Grav UA 1.030      Protein, UA Trace (*)     Glucose, UA Negative      Ketones, UA Negative      Bilirubin, UA Negative      Blood, UA Negative      Nitrites, UA Negative      Urobilinogen, UA Negative      Leukocyte Esterase, UA 2+ (*)    CBC WITH DIFFERENTIAL - Abnormal    WBC 8.49      RBC 4.77      HGB 13.9 (*)     HCT 42.0      MCV 88      MCH 29.1      MCHC 33.1      RDW 13.2      Platelet Count 383      MPV 9.5      Nucleated RBC 0      Neut % 65.5      Lymph % 25.0      Mono % 6.7      Eos % 1.8      Basophil % 0.9      Imm Grans % 0.1      Neut # 5.56      Lymph # 2.12      Mono # 0.57      Eos # 0.15      Baso # 0.08      Imm Grans # 0.01     URINALYSIS MICROSCOPIC - Abnormal    RBC, UA 4      WBC, UA 8 (*)     Bacteria, UA None       Microscopic Comment       COMPREHENSIVE METABOLIC PANEL - Normal    Sodium 140      Potassium 4.2      Chloride 106      CO2 23      Glucose 83      BUN 17      Creatinine 1.1      Calcium 9.7      Protein Total 8.3      Albumin 4.4      Bilirubin Total 1.0      ALP 90      AST 26      ALT 26      Anion Gap 11      eGFR >60     LIPASE - Normal    Lipase Level 20     CBC W/ AUTO DIFFERENTIAL    Narrative:     The following orders were created for panel order CBC auto differential.  Procedure                               Abnormality         Status                     ---------                               -----------         ------                     CBC with Differential[[id number]]       Abnormal            Final result                 Please view results for these tests on the individual orders.   [facility] URINE HOLD          Imaging Results              US Abdomen Limited (Final result)  Result time 07/01/25 16:40:32      Final result by Maury Flores MD (07/01/25 16:40:32)                   Impression:      Cholelithiasis without sonographic evidence of acute cholecystitis.      Electronically signed by: Maury Flores  Date:    07/01/2025  Time:    16:40               Narrative:    EXAMINATION:  US ABDOMEN LIMITED    CLINICAL HISTORY:  RUQ pain;    TECHNIQUE:  Limited sonographic evaluation of the abdomen    COMPARISON:  None.    FINDINGS:  Liver: Right hepatic lobe cyst measuring 2.6 x 2.1 x 2.1 cm.    Gallbladder: Gallbladder sludge versus gallbladder polyp measuring 0.6 cm.  There is a 1.4 cm gallstone.  No sonographic evidence of acute cholecystitis.    Biliary system: The common duct is not dilated, measuring 4.2 mm.  No intrahepatic ductal dilatation.    Miscellaneous: No upper abdominal ascites.                                       Medications   sodium chloride 0.9% bolus 1,000 mL 1,000 mL (0 mLs Intravenous Stopped 7/1/25 1600)   oxyCODONE-acetaminophen 5-325 mg per tablet 1 tablet (1 tablet Oral Given  7/1/25 1734)   ondansetron injection 4 mg (4 mg Intravenous Given 7/1/25 1735)   ketorolac injection 15 mg (15 mg Intravenous Given 7/1/25 1735)     Medical Decision Making  35 y.o.male with presents for emergent evaluation of intermittent RUQ abdominal pain X 1 month now worsened and constant X 1 week. Denies chest pain, shortness of breath, or inability to tolerate PO.    On physical exam, patient is a non-toxic, afebrile, and well appearing.  Abdominal exam with focal tenderness to RUQ.  Otherwise nontender soft and nondistended..  No peritoneal sign, CVA tenderness, guarding, or rebound tenderness.  Physical exam otherwise as above.      Following consideration of available history and our initial physical findings, our differential diagnoses include but are not limited to cholecystitis, choledocholithiasis, cholangitis, colitis, PUD, pancreatitis, gastroenteritis    RESULTS: see ED course    ED MEDS ADMINISTERED:  sodium chloride 0.9% bolus 1,000 mL 1,000 mL (0 mLs Intravenous Stopped 7/1/25 1600)  oxyCODONE-acetaminophen 5-325 mg per tablet 1 tablet (1 tablet Oral Given 7/1/25 1734)  ondansetron injection 4 mg (4 mg Intravenous Given 7/1/25 1735)  ketorolac injection 15 mg (15 mg Intravenous Given 7/1/25 1735)     Findings are consistent wth a diagnosis of symptomatic cholelithiasis.  Patient states pain is controlled at this time and tolerating PO.  Vital signs stable and reassuring.  There are no concerning features on physical exam to suggest an emergent or life threatening condition indicating need for emergent cholecystectomy.  I have consulted Dr. Colon general surgery on-call  regarding the patient's presentation and study results.  He agrees outpatient follow up is appropriate and there was availability for patient to be seen in clinic tomorrow.  I discussed this with patient and helped scheduled with Dr. Curtis. D/c home with pain control and advised on supportive care.  Strict return precautions  discussed including intolerable pain, persistent vomiting, high fevers or any other abnormal or alarming symptoms.   I discussed with the patient the diagnosis, treatment plan, indications for return to the emergency department, and for expected follow-up. The patient verbalized an understanding. The patient is asked if there are any questions or concerns. We discuss the case, until all issues are addressed to the patient's satisfaction. Patient understands and is agreeable to the plan.     Amount and/or Complexity of Data Reviewed  External Data Reviewed: labs and notes.  Labs: ordered. Decision-making details documented in ED Course.  Radiology:  Decision-making details documented in ED Course.    Risk  OTC drugs.  Prescription drug management.               ED Course as of 07/01/25 1846   Tue Jul 01, 2025   1628 WBC, UA(!): 8 [CC]   1628 RBC, UA: 4 [CC]   1628 CBC auto differential(!) [CC]   1628 Comprehensive metabolic panel [CC]   1628 CBC without leukocytosis.  Hemoglobin and hematocrit stable. CMP unremarkable without significant electrolyte derangement, impaired renal function, or elevated LFTs   [CC]   1628 Lipase: 20 [CC]   1721 US Abdomen Limited  Cholelithiasis without sonographic evidence of acute cholecystitis. [CC]   1807 Discussed with Dr. Lovett, general surgeon on-call in order for patient to be seen in clinic.  States Dr. Colon has openings in clinic tomorrow and recommends he is [facility]. [CC]   1808 I discussed with patient and helped him scheduled a follow up appointment for tomorrow. [CC]   1809 He states pain is controlled.  Tolerating PO.  Vital signs stable and reassuring. [CC]      ED Course User Index  [CC] Lety Espinoza PA-C                           Clinical Impression:  Final diagnoses:  [K80.20] Symptomatic cholelithiasis (Primary)          ED Disposition Condition    Discharge Stable          ED Prescriptions       Medication Sig Dispense Start Date End  Date Auth. Provider    oxyCODONE-acetaminophen (PERCOCET) 5-325 mg per tablet Take 1 tablet by mouth every 6 (six) hours as needed for Pain. 10 each 7/1/2025 -- Lety Espinoza PA-C    ondansetron (ZOFRAN-ODT) 4 MG TbDL Take 1 tablet (4 mg total) by mouth 3 (three) times daily as needed (nausea). 20 tablet 7/1/2025 -- Lety Espinoza PA-C          Follow-up Information       Follow up With Specialties Details Why Contact Info    [facility] Emergency Dept Emergency Medicine Go to  For new or worsening symptoms [address] [facility] [place] [address] [phone]    Chris Colon MD General Surgery, Surgery   [address]  [address]  [facility] [address]  [phone]                     [1]   Social History  Tobacco Use    Smoking status: Never    Smokeless tobacco: Never   Substance Use Topics    Alcohol use: Not Currently    Drug use: Never        Lety Espinoza PA-C  07/01/25 8192

## 2025-07-02 ENCOUNTER — HOSPITAL ENCOUNTER (OUTPATIENT)
Dept: PREADMISSION TESTING | Facility: HOSPITAL | Age: 35
Discharge: HOME | End: 2025-07-02
Attending: SURGERY
Payer: OTHER GOVERNMENT

## 2025-07-02 ENCOUNTER — HOSPITAL ENCOUNTER (OUTPATIENT)
Dept: RADIOLOGY | Facility: HOSPITAL | Age: 35
Discharge: HOME | End: 2025-07-02
Attending: SURGERY
Payer: OTHER GOVERNMENT

## 2025-07-02 ENCOUNTER — OFFICE VISIT (OUTPATIENT)
Dept: SURGERY | Facility: CLINIC | Age: 35
End: 2025-07-02
Payer: OTHER GOVERNMENT

## 2025-07-02 ENCOUNTER — ANESTHESIA EVENT (OUTPATIENT)
Dept: SURGERY | Facility: HOSPITAL | Age: 35
End: 2025-07-02
Payer: OTHER GOVERNMENT

## 2025-07-02 VITALS
OXYGEN SATURATION: 97 % | DIASTOLIC BLOOD PRESSURE: 69 MMHG | TEMPERATURE: 97 F | SYSTOLIC BLOOD PRESSURE: 117 MMHG | HEIGHT: 70 IN | HEART RATE: 71 BPM | BODY MASS INDEX: 29.26 KG/M2 | WEIGHT: 204.38 LBS | RESPIRATION RATE: 16 BRPM

## 2025-07-02 VITALS
HEIGHT: 70 IN | HEART RATE: 70 BPM | WEIGHT: 195.13 LBS | SYSTOLIC BLOOD PRESSURE: 112 MMHG | DIASTOLIC BLOOD PRESSURE: 77 MMHG | BODY MASS INDEX: 27.94 KG/M2

## 2025-07-02 DIAGNOSIS — Z01.818 PRE-OP TESTING: Primary | ICD-10-CM

## 2025-07-02 DIAGNOSIS — K80.20 SYMPTOMATIC CHOLELITHIASIS: ICD-10-CM

## 2025-07-02 DIAGNOSIS — K80.20 GALLSTONES: Primary | ICD-10-CM

## 2025-07-02 LAB — HOLD SPECIMEN: NORMAL

## 2025-07-02 PROCEDURE — 71046 X-RAY EXAM CHEST 2 VIEWS: CPT | Mod: TC,FY

## 2025-07-02 PROCEDURE — 93005 ELECTROCARDIOGRAM TRACING: CPT

## 2025-07-02 PROCEDURE — 99999 PR PBB SHADOW E&M-EST. PATIENT-LVL V: CPT | Mod: PBBFAC,,, | Performed by: SURGERY

## 2025-07-02 PROCEDURE — 99215 OFFICE O/P EST HI 40 MIN: CPT | Mod: PBBFAC | Performed by: SURGERY

## 2025-07-02 PROCEDURE — 71046 X-RAY EXAM CHEST 2 VIEWS: CPT | Mod: 26,,, | Performed by: RADIOLOGY

## 2025-07-02 PROCEDURE — 93010 ELECTROCARDIOGRAM REPORT: CPT | Mod: ,,, | Performed by: INTERNAL MEDICINE

## 2025-07-02 RX ORDER — SODIUM CHLORIDE 9 MG/ML
INJECTION, SOLUTION INTRAVENOUS CONTINUOUS
OUTPATIENT
Start: 2025-07-02

## 2025-07-02 RX ORDER — INDOCYANINE GREEN AND WATER 25 MG
1.25 KIT INJECTION
OUTPATIENT
Start: 2025-07-02

## 2025-07-02 RX ORDER — CEFAZOLIN SODIUM 2 G/50ML
2 SOLUTION INTRAVENOUS
OUTPATIENT
Start: 2025-07-02

## 2025-07-02 NOTE — DISCHARGE INSTRUCTIONS
YOUR PROCEDURE WILL BE AT [facility] at [address] [place] [address]                 Enter through the Main Entrance facing [address].      Your procedure  is scheduled for __7/7/2025________.    Call [phone] between 2pm and 5pm on _7/3/2025______to find out your arrival time for the day of surgery.    You may have up to three visitors.  No children under 18 years old.     You will be going to the Same Day Surgery Unit on the 2nd floor of the hospital.    Report to the Same Day Surgery Registration Desk in the hallway.(Just beside the Same Day Surgery Unit)                    DO NOT PARK IN THE GARAGE.  THERE IS NO OPEN ENTRANCE TO THE HOSPITAL BUILDING AND NO ELEVATOR.      Important instructions:  Do not eat anything after midnight.  You may have plain water, non carbonated.  You may also have Gatorade or Powerade(avoid red/blue) after midnight.    Stop all fluids 2 hours before your surgery.    It is okay to brush your teeth.  Do not have gum, candy or mints.    SEE MEDICATION SHEET.   TAKE MEDICATIONS AS DIRECTED.      All GLP-1 weekly diabetic/weight loss medications must not be taken for one week before your surgery, or your surgery could be canceled.      STOP taking for 7 days before surgery:    Aspirin           Voltaren (Diclofenac)  Ibuprofen  (Advil, Motrin)                Indomethocin  Mobic (meloxicam, celebrex)      Etodolac   Aleve (naproxen)          Toradol (ketoralac)  Fish oil, Krill oil and Vitamin E  Headache Powders (BC Powder, Goody's Powder, Stanback)                           You may take Tylenol if needed which is not a blood thinner.    Please shower the night before and the morning of your surgery.      Follow any Prep Instructions given by your surgeon.               Use Chlorhexidine soap as instructed by your pre op nurse.   Please place clean linens on your bed the night before surgery. Please wear fresh clean clothing after each  shower.      Contact lenses and removable denture work may not be worn during your procedure.    You may wear deodorant only. If you are having breast surgery, do not wear deodorant on the operative side.    Do not wear powder, body lotion, perfume/cologne or make-up.    Do not wear any jewelry or have any metal on your body.    You will be asked to remove any dentures or partials for the procedure.    If you are going home on the same day of surgery, you must arrange for a family member or a friend to drive you home.  Public transportation is prohibited.  You will not be able to drive home if you were given anesthesia or sedation.    Patients who want to have their Post-op prescriptions filled from our in-house [facility], bring a Credit/Debit Card or cash with you. A co-pay may be required.  The pharmacy closes at 5:30 pm.    Wear loose fitting clothes allowing for bandages.    Please leave money and valuables home.      You may bring your cell phone.    Call the doctor if fever or illness should occur before your surgery.    Call [phone] to contact us here if needed.                            CLOTHES ON DAY OF SURGERY    SHOULDER surgery:  you must have a very oversized shirt.  Very, Very large.  You will probably have a large sling on with your arm strapped to your chest.  You will not be able to put the arm of the operated shoulder into a sleeve.  You can put the arm of the un-operated shoulder into the sleeve, but the shirt will need to be draped over the operated shoulder.       ARM or HAND surgery:  make sure that your sleeves are large and loose enough to pass over large dressings or cast.      BREAST or UNDERARM surgery:  wear a loose, button down shirt so that you can dress without raising your arms over your head.    ABDOMINAL surgery:  wear loose, comfortable clothing.  Nothing tight around the abdomen.  NO JEANS    PENIS or SCROTAL surgery:  loose comfortable clothing.  Large sweat pants,  pajama pants or a robe.  ABSOLUTELY NO JEANS      LEG or FOOT surgery:  wear large loose pants that are able to pass over any large dressings or casts.  You could also wear loose shorts or a skirt.

## 2025-07-02 NOTE — ANESTHESIA PREPROCEDURE EVALUATION
07/02/2025  Jasson Velasco is a 35 y.o., male scheduled for CHOLECYSTECTOMY, LAPAROSCOPIC (Abdomen) on 7/7/2025.      Past Medical History:   Diagnosis Date    Biceps tendonitis on right 01/18/2024    SLAP lesion of right shoulder 01/18/2024       Past Surgical History:   Procedure Laterality Date    ARTHROSCOPIC DEBRIDEMENT OF SHOULDER Right 1/18/2024    Procedure: DEBRIDEMENT, ROTATOR CUFF, SHOULDER, ARTHROSCOPIC;  Surgeon: Samia Villalba MD;  Location: [facility] OR;  Service: Orthopedics;  Laterality: Right;    ARTHROSCOPY OF SHOULDER WITH DECOMPRESSION OF SUBACROMIAL SPACE Right 1/18/2024    Procedure: ARTHROSCOPY, SHOULDER, WITH SUBACROMIAL DECOMPRESSION;  Surgeon: Samia Villalba MD;  Location: [facility] OR;  Service: Orthopedics;  Laterality: Right;    COLONOSCOPY N/A 5/25/2023    Procedure: COLONOSCOPY;  Surgeon: Ronda Feliciano MD;  Location: [facility];  Service: Colon and Rectal;  Laterality: N/A;    DISTAL CLAVICLE EXCISION Right 1/18/2024    Procedure: EXCISION, CLAVICLE, DISTAL;  Surgeon: Samia Villalba MD;  Location: [facility] OR;  Service: Orthopedics;  Laterality: Right;    REPAIR Right 1/18/2024    Procedure: INSTIBILITY REPAIR, ARTHROSCOPY SHOULDER;  Surgeon: Samia Villalba MD;  Location: [facility] OR;  Service: Orthopedics;  Laterality: Right;    SHOULDER ARTHROSCOPY W/ SUPERIOR LABRAL ANTERIOR POSTERIOR LESION REPAIR Right 1/18/2024    Procedure: ARTHROSCOPY, SHOULDER, WITH SLAP REPAIR;  Surgeon: Samia Villalba MD;  Location: [facility] OR;  Service: Orthopedics;  Laterality: Right;           Pre-op Assessment    I have reviewed the Patient Summary Reports.     I have reviewed the Nursing Notes.       Review of Systems  Anesthesia Hx:  No problems with previous Anesthesia             Denies Family Hx of Anesthesia complications.    Denies Personal Hx of Anesthesia complications.                    Social:  Non-Smoker, No Alcohol  Use       Hematology/Oncology:  Hematology Normal   Oncology Normal                                   EENT/Dental:  EENT/Dental Normal           Cardiovascular:  Exercise tolerance: good                                             Pulmonary:  Pulmonary Normal                       Renal/:  Renal/ Normal                 Hepatic/GI:        gallstones             Musculoskeletal:  Musculoskeletal Normal                Neurological:  Neurology Normal                                      Endocrine:  Endocrine Normal            Dermatological:  Skin Normal    Psych:  Psychiatric History anxiety depression                Physical Exam  General: Well nourished, Cooperative, Alert and Oriented    Airway:  Mallampati: II   Mouth Opening: Normal  TM Distance: Normal  Tongue: Normal  Neck ROM: Normal ROM    Dental:  Intact    Chest/Lungs:  Normal Respiratory Rate, Clear to auscultation    Heart:  Rate: Normal  Rhythm: Regular Rhythm      Wt Readings from Last 3 Encounters:   07/03/25 92.8 kg (204 lb 8 oz)   07/02/25 92.7 kg (204 lb 5.9 oz)   07/02/25 88.5 kg (195 lb 1.7 oz)     Temp Readings from Last 3 Encounters:   07/07/25 36.5 °C (97.7 °F)   07/02/25 36.2 °C (97.1 °F) (Oral)   07/01/25 36.6 °C (97.9 °F) (Oral)     BP Readings from Last 3 Encounters:   07/07/25 123/73   07/02/25 117/69   07/02/25 112/77     Pulse Readings from Last 3 Encounters:   07/07/25 62   07/02/25 71   07/02/25 70     Lab Results   Component Value Date    WBC 8.49 07/01/2025    HGB 13.9 (L) 07/01/2025    HCT 42.0 07/01/2025    MCV 88 07/01/2025     07/01/2025       CMP  Sodium   Date Value Ref Range Status   07/01/2025 140 136 - 145 mmol/L Final     Potassium   Date Value Ref Range Status   07/01/2025 4.2 3.5 - 5.1 mmol/L Final     Chloride   Date Value Ref Range Status   07/01/2025 106 95 - 110 mmol/L Final     CO2   Date Value Ref Range Status   07/01/2025 23 23 - 29 mmol/L Final     Glucose   Date Value Ref Range Status   07/01/2025 83 70  - 110 mg/dL Final     BUN   Date Value Ref Range Status   07/01/2025 17 6 - 20 mg/dL Final     Creatinine   Date Value Ref Range Status   07/01/2025 1.1 0.5 - 1.4 mg/dL Final     Calcium   Date Value Ref Range Status   07/01/2025 9.7 8.7 - 10.5 mg/dL Final     Protein Total   Date Value Ref Range Status   07/01/2025 8.3 6.0 - 8.4 gm/dL Final     Albumin   Date Value Ref Range Status   07/01/2025 4.4 3.5 - 5.2 g/dL Final     Bilirubin Total   Date Value Ref Range Status   07/01/2025 1.0 0.1 - 1.0 mg/dL Final     Comment:     For infants and newborns, interpretation of results should be based   on gestational age, weight and in agreement with clinical   observations.    Premature Infant recommended reference ranges:   0-24 hours:  <8.0 mg/dL   24-48 hours: <12.0 mg/dL   3-5 days:    <15.0 mg/dL   6-29 days:   <15.0 mg/dL     ALP   Date Value Ref Range Status   07/01/2025 90 40 - 150 unit/L Final     AST   Date Value Ref Range Status   07/01/2025 26 11 - 45 unit/L Final     ALT   Date Value Ref Range Status   07/01/2025 26 10 - 44 unit/L Final     Anion Gap   Date Value Ref Range Status   07/01/2025 11 8 - 16 mmol/L Final     eGFR   Date Value Ref Range Status   07/01/2025 >60 >60 mL/min/1.73/m2 Final     Comment:     Estimated GFR calculated using the CKD-EPI creatinine (2021) equation.         Anesthesia Plan  Type of Anesthesia, risks & benefits discussed:    Anesthesia Type: Gen ETT  Intra-op Monitoring Plan: Standard ASA Monitors  Post Op Pain Control Plan: multimodal analgesia and IV/PO Opioids PRN  Induction:  IV  Informed Consent: Informed consent signed with the Patient and all parties understand the risks and agree with anesthesia plan.  All questions answered.   ASA Score: 2  Day of Surgery Review of History & Physical: H&P Update referred to the surgeon/provider.    Ready For Surgery From Anesthesia Perspective.     .

## 2025-07-02 NOTE — H&P
History & Physical    Subjective     History of Present Illness:  Patient is a 35 y.o. male presents with ruq pain and gallstones.  Sludge and stones noted on u/s yesterday. He has had pain for 1 wk. Eating well. No fevers. Interested in cholecystectomy.  No prior abdominal surgical hx. No jaundice.    Chief Complaint   Patient presents with    Gall Bladder Problem       Review of patient's allergies indicates:  No Known Allergies    Current Medications[1]    Past Medical History:   Diagnosis Date    Biceps tendonitis on right 01/18/2024    SLAP lesion of right shoulder 01/18/2024     Past Surgical History:   Procedure Laterality Date    ARTHROSCOPIC DEBRIDEMENT OF SHOULDER Right 1/18/2024    Procedure: DEBRIDEMENT, ROTATOR CUFF, SHOULDER, ARTHROSCOPIC;  Surgeon: Samia Villalba MD;  Location: [facility] OR;  Service: Orthopedics;  Laterality: Right;    ARTHROSCOPY OF SHOULDER WITH DECOMPRESSION OF SUBACROMIAL SPACE Right 1/18/2024    Procedure: ARTHROSCOPY, SHOULDER, WITH SUBACROMIAL DECOMPRESSION;  Surgeon: Samia Villalba MD;  Location: [facility] OR;  Service: Orthopedics;  Laterality: Right;    COLONOSCOPY N/A 5/25/2023    Procedure: COLONOSCOPY;  Surgeon: Ronda Feliciano MD;  Location: [facility];  Service: Colon and Rectal;  Laterality: N/A;    DISTAL CLAVICLE EXCISION Right 1/18/2024    Procedure: EXCISION, CLAVICLE, DISTAL;  Surgeon: Samia Villalba MD;  Location: [facility] OR;  Service: Orthopedics;  Laterality: Right;    REPAIR Right 1/18/2024    Procedure: INSTIBILITY REPAIR, ARTHROSCOPY SHOULDER;  Surgeon: Samia Villalba MD;  Location: [facility] OR;  Service: Orthopedics;  Laterality: Right;    SHOULDER ARTHROSCOPY W/ SUPERIOR LABRAL ANTERIOR POSTERIOR LESION REPAIR Right 1/18/2024    Procedure: ARTHROSCOPY, SHOULDER, WITH SLAP REPAIR;  Surgeon: Samia Villalba MD;  Location: [facility] OR;  Service: Orthopedics;  Laterality: Right;     No family history on file.  Social History[2]     Review of Systems:  Review of Systems   Constitutional:   "Negative for chills and fever.   HENT: Negative.     Eyes: Negative.    Respiratory:  Negative for cough, chest tightness and shortness of breath.    Cardiovascular: Negative.    Gastrointestinal:  Positive for abdominal pain. Negative for blood in stool, constipation, diarrhea, nausea and vomiting.   Endocrine: Negative for cold intolerance and heat intolerance.   Genitourinary: Negative.    Musculoskeletal: Negative.    Skin: Negative.  Negative for rash.   Neurological:  Negative for dizziness, syncope and light-headedness.   Psychiatric/Behavioral:  Negative for agitation, confusion and hallucinations.           Objective     Vital Signs (Most Recent)  Pulse: 70 (07/02/25 0850)  BP: 112/77 (07/02/25 0850)  5' 10" (1.778 m)  88.5 kg (195 lb 1.7 oz)     Physical Exam:  Physical Exam  Constitutional:       General: He is not in acute distress.     Appearance: He is well-developed. He is not diaphoretic.   HENT:      Head: Normocephalic and atraumatic.   Eyes:      Conjunctiva/sclera: Conjunctivae normal.      Pupils: Pupils are equal, round, and reactive to light.   Cardiovascular:      Rate and Rhythm: Normal rate and regular rhythm.      Pulses: Normal pulses.      Heart sounds: Normal heart sounds.   Pulmonary:      Effort: Pulmonary effort is normal. No respiratory distress.      Breath sounds: Normal breath sounds. No stridor. No wheezing.   Abdominal:      General: Bowel sounds are normal. There is no distension.      Palpations: Abdomen is soft.      Tenderness: There is abdominal tenderness (ruq pain).   Musculoskeletal:         General: Normal range of motion.      Cervical back: Normal range of motion and neck supple.   Skin:     General: Skin is warm and dry.      Findings: No rash.   Neurological:      Mental Status: He is alert and oriented to person, place, and time.      Cranial Nerves: No cranial nerve deficit.   Psychiatric:         Behavior: Behavior normal.         Laboratory  CBC: Reviewed  CMP: " Reviewed  wnl    Diagnostic Results:  US: Reviewed  Sludge/stones. No cholecystitis findings       Assessment and Plan   35 yr old WM w gallstones, ruq pain    PLAN:    To OR 7/7/25 for lap allan, Risks and side effects discussed with the patient. Alterative therapies discussed. Patient agreeable to procedure and has signed consent which was discussed in detail.                [1]   Current Outpatient Medications   Medication Sig Dispense Refill    aspirin (ECOTRIN) 81 MG EC tablet Take 1 tablet (81 mg total) by mouth once daily. For 4 weeks starting the day after surgery.      benzonatate (TESSALON) 100 MG capsule Take 100 mg by mouth 3 (three) times daily as needed.      cetirizine (ZYRTEC) 10 MG tablet Take 10 mg by mouth daily as needed for Allergies.      docusate sodium (COLACE) 100 MG capsule Take 1 capsule (100 mg total) by mouth 2 (two) times daily as needed for Constipation. 14 capsule 0    fluticasone propionate (FLONASE) 50 mcg/actuation nasal spray 2 sprays by Each Nostril route daily as needed for Rhinitis. Shake Liquid and use      meloxicam (MOBIC) 15 MG tablet Take 15 mg by mouth daily as needed.      meloxicam (MOBIC) 7.5 MG tablet Take 1 tablet (7.5 mg total) by mouth once daily. 12 tablet 0    ondansetron (ZOFRAN-ODT) 4 MG TbDL Take 1 tablet (4 mg total) by mouth 3 (three) times daily as needed (nausea). 20 tablet 0    oxyCODONE-acetaminophen (PERCOCET)  mg per tablet Take 1 tablet by mouth every 4-6 hours as needed for pain. Take stool softener with this medication. 21 tablet 0    oxyCODONE-acetaminophen (PERCOCET) 5-325 mg per tablet Take 1 tablet by mouth every 6 (six) hours as needed for Pain. 10 each 0    promethazine (PHENERGAN) 25 MG tablet Take 1 tablet (25 mg total) by mouth every 6 (six) hours as needed for Nausea. 10 tablet 0    sildenafiL (VIAGRA) 50 MG tablet Take 50 mg by mouth daily as needed for Erectile Dysfunction.      traMADoL (ULTRAM) 50 mg tablet Take 1-2 tablets  ( mg total) by mouth every 6 (six) hours as needed for Pain. 21 tablet 0    albuterol (PROVENTIL/VENTOLIN HFA) 90 mcg/actuation inhaler Inhale 1-2 puffs into the lungs every 6 (six) hours as needed. Rescue 18 g 0    buPROPion (WELLBUTRIN XL) 150 MG TB24 tablet Do not drink alcohol.Take or use exactly as directed.Obtain advice for OTCs.May impair driving.Swallow whole.Check with your doctor before becoming pregnant. (Patient not taking: Reported on 10/30/2024)      venlafaxine (EFFEXOR-XR) 37.5 MG 24 hr capsule Take 37.5 mg by mouth once daily.       No current facility-administered medications for this visit.   [2]   Social History  Tobacco Use    Smoking status: Never    Smokeless tobacco: Never   Substance Use Topics    Alcohol use: Not Currently    Drug use: Never

## 2025-07-02 NOTE — H&P (VIEW-ONLY)
History & Physical    Subjective     History of Present Illness:  Patient is a 35 y.o. male presents with ruq pain and gallstones.  Sludge and stones noted on u/s yesterday. He has had pain for 1 wk. Eating well. No fevers. Interested in cholecystectomy.  No prior abdominal surgical hx. No jaundice.    Chief Complaint   Patient presents with    Gall Bladder Problem       Review of patient's allergies indicates:  No Known Allergies    Current Medications[1]    Past Medical History:   Diagnosis Date    Biceps tendonitis on right 01/18/2024    SLAP lesion of right shoulder 01/18/2024     Past Surgical History:   Procedure Laterality Date    ARTHROSCOPIC DEBRIDEMENT OF SHOULDER Right 1/18/2024    Procedure: DEBRIDEMENT, ROTATOR CUFF, SHOULDER, ARTHROSCOPIC;  Surgeon: Samia Villalba MD;  Location: [facility] OR;  Service: Orthopedics;  Laterality: Right;    ARTHROSCOPY OF SHOULDER WITH DECOMPRESSION OF SUBACROMIAL SPACE Right 1/18/2024    Procedure: ARTHROSCOPY, SHOULDER, WITH SUBACROMIAL DECOMPRESSION;  Surgeon: Samia Vilallba MD;  Location: [facility] OR;  Service: Orthopedics;  Laterality: Right;    COLONOSCOPY N/A 5/25/2023    Procedure: COLONOSCOPY;  Surgeon: Ronda Feliciano MD;  Location: [facility];  Service: Colon and Rectal;  Laterality: N/A;    DISTAL CLAVICLE EXCISION Right 1/18/2024    Procedure: EXCISION, CLAVICLE, DISTAL;  Surgeon: Samia Villalba MD;  Location: [facility] OR;  Service: Orthopedics;  Laterality: Right;    REPAIR Right 1/18/2024    Procedure: INSTIBILITY REPAIR, ARTHROSCOPY SHOULDER;  Surgeon: Samia Villalba MD;  Location: [facility] OR;  Service: Orthopedics;  Laterality: Right;    SHOULDER ARTHROSCOPY W/ SUPERIOR LABRAL ANTERIOR POSTERIOR LESION REPAIR Right 1/18/2024    Procedure: ARTHROSCOPY, SHOULDER, WITH SLAP REPAIR;  Surgeon: Samia Villalba MD;  Location: [facility] OR;  Service: Orthopedics;  Laterality: Right;     No family history on file.  Social History[2]     Review of Systems:  Review of Systems   Constitutional:   "Negative for chills and fever.   HENT: Negative.     Eyes: Negative.    Respiratory:  Negative for cough, chest tightness and shortness of breath.    Cardiovascular: Negative.    Gastrointestinal:  Positive for abdominal pain. Negative for blood in stool, constipation, diarrhea, nausea and vomiting.   Endocrine: Negative for cold intolerance and heat intolerance.   Genitourinary: Negative.    Musculoskeletal: Negative.    Skin: Negative.  Negative for rash.   Neurological:  Negative for dizziness, syncope and light-headedness.   Psychiatric/Behavioral:  Negative for agitation, confusion and hallucinations.           Objective     Vital Signs (Most Recent)  Pulse: 70 (07/02/25 0850)  BP: 112/77 (07/02/25 0850)  5' 10" (1.778 m)  88.5 kg (195 lb 1.7 oz)     Physical Exam:  Physical Exam  Constitutional:       General: He is not in acute distress.     Appearance: He is well-developed. He is not diaphoretic.   HENT:      Head: Normocephalic and atraumatic.   Eyes:      Conjunctiva/sclera: Conjunctivae normal.      Pupils: Pupils are equal, round, and reactive to light.   Cardiovascular:      Rate and Rhythm: Normal rate and regular rhythm.      Pulses: Normal pulses.      Heart sounds: Normal heart sounds.   Pulmonary:      Effort: Pulmonary effort is normal. No respiratory distress.      Breath sounds: Normal breath sounds. No stridor. No wheezing.   Abdominal:      General: Bowel sounds are normal. There is no distension.      Palpations: Abdomen is soft.      Tenderness: There is abdominal tenderness (ruq pain).   Musculoskeletal:         General: Normal range of motion.      Cervical back: Normal range of motion and neck supple.   Skin:     General: Skin is warm and dry.      Findings: No rash.   Neurological:      Mental Status: He is alert and oriented to person, place, and time.      Cranial Nerves: No cranial nerve deficit.   Psychiatric:         Behavior: Behavior normal.         Laboratory  CBC: Reviewed  CMP: " Reviewed  wnl    Diagnostic Results:  US: Reviewed  Sludge/stones. No cholecystitis findings       Assessment and Plan   35 yr old WM w gallstones, ruq pain    PLAN:    To OR 7/7/25 for lap allan, Risks and side effects discussed with the patient. Alterative therapies discussed. Patient agreeable to procedure and has signed consent which was discussed in detail.                [1]   Current Outpatient Medications   Medication Sig Dispense Refill    aspirin (ECOTRIN) 81 MG EC tablet Take 1 tablet (81 mg total) by mouth once daily. For 4 weeks starting the day after surgery.      benzonatate (TESSALON) 100 MG capsule Take 100 mg by mouth 3 (three) times daily as needed.      cetirizine (ZYRTEC) 10 MG tablet Take 10 mg by mouth daily as needed for Allergies.      docusate sodium (COLACE) 100 MG capsule Take 1 capsule (100 mg total) by mouth 2 (two) times daily as needed for Constipation. 14 capsule 0    fluticasone propionate (FLONASE) 50 mcg/actuation nasal spray 2 sprays by Each Nostril route daily as needed for Rhinitis. Shake Liquid and use      meloxicam (MOBIC) 15 MG tablet Take 15 mg by mouth daily as needed.      meloxicam (MOBIC) 7.5 MG tablet Take 1 tablet (7.5 mg total) by mouth once daily. 12 tablet 0    ondansetron (ZOFRAN-ODT) 4 MG TbDL Take 1 tablet (4 mg total) by mouth 3 (three) times daily as needed (nausea). 20 tablet 0    oxyCODONE-acetaminophen (PERCOCET)  mg per tablet Take 1 tablet by mouth every 4-6 hours as needed for pain. Take stool softener with this medication. 21 tablet 0    oxyCODONE-acetaminophen (PERCOCET) 5-325 mg per tablet Take 1 tablet by mouth every 6 (six) hours as needed for Pain. 10 each 0    promethazine (PHENERGAN) 25 MG tablet Take 1 tablet (25 mg total) by mouth every 6 (six) hours as needed for Nausea. 10 tablet 0    sildenafiL (VIAGRA) 50 MG tablet Take 50 mg by mouth daily as needed for Erectile Dysfunction.      traMADoL (ULTRAM) 50 mg tablet Take 1-2 tablets  ( mg total) by mouth every 6 (six) hours as needed for Pain. 21 tablet 0    albuterol (PROVENTIL/VENTOLIN HFA) 90 mcg/actuation inhaler Inhale 1-2 puffs into the lungs every 6 (six) hours as needed. Rescue 18 g 0    buPROPion (WELLBUTRIN XL) 150 MG TB24 tablet Do not drink alcohol.Take or use exactly as directed.Obtain advice for OTCs.May impair driving.Swallow whole.Check with your doctor before becoming pregnant. (Patient not taking: Reported on 10/30/2024)      venlafaxine (EFFEXOR-XR) 37.5 MG 24 hr capsule Take 37.5 mg by mouth once daily.       No current facility-administered medications for this visit.   [2]   Social History  Tobacco Use    Smoking status: Never    Smokeless tobacco: Never   Substance Use Topics    Alcohol use: Not Currently    Drug use: Never

## 2025-07-03 ENCOUNTER — TELEPHONE (OUTPATIENT)
Dept: SURGERY | Facility: HOSPITAL | Age: 35
End: 2025-07-03
Payer: OTHER GOVERNMENT

## 2025-07-03 LAB
OHS QRS DURATION: 78 MS
OHS QTC CALCULATION: 399 MS

## 2025-07-06 RX ORDER — LIDOCAINE HYDROCHLORIDE 10 MG/ML
1 INJECTION, SOLUTION EPIDURAL; INFILTRATION; INTRACAUDAL; PERINEURAL ONCE
OUTPATIENT
Start: 2025-07-06 | End: 2025-07-06

## 2025-07-07 ENCOUNTER — HOSPITAL ENCOUNTER (OUTPATIENT)
Facility: HOSPITAL | Age: 35
Discharge: HOME | End: 2025-07-07
Attending: SURGERY | Admitting: SURGERY
Payer: OTHER GOVERNMENT

## 2025-07-07 ENCOUNTER — ANESTHESIA (OUTPATIENT)
Dept: SURGERY | Facility: HOSPITAL | Age: 35
End: 2025-07-07
Payer: OTHER GOVERNMENT

## 2025-07-07 DIAGNOSIS — K80.20 GALLSTONES: Primary | ICD-10-CM

## 2025-07-07 PROCEDURE — 63600175 PHARM REV CODE 636 W HCPCS: Performed by: ANESTHESIOLOGY

## 2025-07-07 PROCEDURE — 37000009 HC ANESTHESIA EA ADD 15 MINS: Performed by: SURGERY

## 2025-07-07 PROCEDURE — 63600175 PHARM REV CODE 636 W HCPCS: Mod: JZ,TB

## 2025-07-07 PROCEDURE — 37000008 HC ANESTHESIA 1ST 15 MINUTES: Performed by: SURGERY

## 2025-07-07 PROCEDURE — 63600175 PHARM REV CODE 636 W HCPCS: Performed by: SURGERY

## 2025-07-07 PROCEDURE — 71000015 HC POSTOP RECOV 1ST HR: Performed by: SURGERY

## 2025-07-07 PROCEDURE — 71000039 HC RECOVERY, EACH ADD'L HOUR: Performed by: SURGERY

## 2025-07-07 PROCEDURE — 25000003 PHARM REV CODE 250: Performed by: NURSE ANESTHETIST, CERTIFIED REGISTERED

## 2025-07-07 PROCEDURE — 25000003 PHARM REV CODE 250: Performed by: STUDENT IN AN ORGANIZED HEALTH CARE EDUCATION/TRAINING PROGRAM

## 2025-07-07 PROCEDURE — 36000708 HC OR TIME LEV III 1ST 15 MIN: Performed by: SURGERY

## 2025-07-07 PROCEDURE — 36000709 HC OR TIME LEV III EA ADD 15 MIN: Performed by: SURGERY

## 2025-07-07 PROCEDURE — 71000033 HC RECOVERY, INTIAL HOUR: Performed by: SURGERY

## 2025-07-07 PROCEDURE — 63600175 PHARM REV CODE 636 W HCPCS: Performed by: STUDENT IN AN ORGANIZED HEALTH CARE EDUCATION/TRAINING PROGRAM

## 2025-07-07 PROCEDURE — 27201423 OPTIME MED/SURG SUP & DEVICES STERILE SUPPLY: Performed by: SURGERY

## 2025-07-07 PROCEDURE — 63600175 PHARM REV CODE 636 W HCPCS: Performed by: NURSE ANESTHETIST, CERTIFIED REGISTERED

## 2025-07-07 PROCEDURE — 88304 TISSUE EXAM BY PATHOLOGIST: CPT | Mod: TC | Performed by: SURGERY

## 2025-07-07 PROCEDURE — 47562 LAPAROSCOPIC CHOLECYSTECTOMY: CPT | Mod: ,,, | Performed by: SURGERY

## 2025-07-07 PROCEDURE — 25000003 PHARM REV CODE 250: Performed by: SURGERY

## 2025-07-07 RX ORDER — PROPOFOL 10 MG/ML
VIAL (ML) INTRAVENOUS
Status: DISCONTINUED | OUTPATIENT
Start: 2025-07-07 | End: 2025-07-07

## 2025-07-07 RX ORDER — GLUCAGON 1 MG
1 KIT INJECTION
Status: DISCONTINUED | OUTPATIENT
Start: 2025-07-07 | End: 2025-07-07 | Stop reason: HOSPADM

## 2025-07-07 RX ORDER — FENTANYL CITRATE 50 UG/ML
INJECTION, SOLUTION INTRAMUSCULAR; INTRAVENOUS
Status: DISCONTINUED | OUTPATIENT
Start: 2025-07-07 | End: 2025-07-07

## 2025-07-07 RX ORDER — HYDROMORPHONE HYDROCHLORIDE 2 MG/ML
0.2 INJECTION, SOLUTION INTRAMUSCULAR; INTRAVENOUS; SUBCUTANEOUS EVERY 5 MIN PRN
Status: DISCONTINUED | OUTPATIENT
Start: 2025-07-07 | End: 2025-07-07 | Stop reason: HOSPADM

## 2025-07-07 RX ORDER — BUPIVACAINE HYDROCHLORIDE 2.5 MG/ML
INJECTION, SOLUTION INFILTRATION; PERINEURAL
Status: DISCONTINUED | OUTPATIENT
Start: 2025-07-07 | End: 2025-07-07 | Stop reason: HOSPADM

## 2025-07-07 RX ORDER — HYDROCODONE BITARTRATE AND ACETAMINOPHEN 5; 325 MG/1; MG/1
1 TABLET ORAL EVERY 6 HOURS PRN
Qty: 15 TABLET | Refills: 0 | Status: SHIPPED | OUTPATIENT
Start: 2025-07-07

## 2025-07-07 RX ORDER — KETOROLAC TROMETHAMINE 10 MG/1
10 TABLET, FILM COATED ORAL EVERY 6 HOURS
Qty: 20 TABLET | Refills: 0 | Status: SHIPPED | OUTPATIENT
Start: 2025-07-07 | End: 2025-07-12

## 2025-07-07 RX ORDER — DEXAMETHASONE SODIUM PHOSPHATE 4 MG/ML
INJECTION, SOLUTION INTRA-ARTICULAR; INTRALESIONAL; INTRAMUSCULAR; INTRAVENOUS; SOFT TISSUE
Status: DISCONTINUED | OUTPATIENT
Start: 2025-07-07 | End: 2025-07-07

## 2025-07-07 RX ORDER — CEFAZOLIN 2 G/1
2 INJECTION, POWDER, FOR SOLUTION INTRAMUSCULAR; INTRAVENOUS
Status: COMPLETED | OUTPATIENT
Start: 2025-07-07 | End: 2025-07-07

## 2025-07-07 RX ORDER — ONDANSETRON HYDROCHLORIDE 2 MG/ML
4 INJECTION, SOLUTION INTRAVENOUS DAILY PRN
Status: COMPLETED | OUTPATIENT
Start: 2025-07-07 | End: 2025-07-07

## 2025-07-07 RX ORDER — SODIUM CHLORIDE, SODIUM LACTATE, POTASSIUM CHLORIDE, CALCIUM CHLORIDE 600; 310; 30; 20 MG/100ML; MG/100ML; MG/100ML; MG/100ML
INJECTION, SOLUTION INTRAVENOUS CONTINUOUS
Status: DISCONTINUED | OUTPATIENT
Start: 2025-07-07 | End: 2025-07-07 | Stop reason: HOSPADM

## 2025-07-07 RX ORDER — ACETAMINOPHEN 500 MG
1000 TABLET ORAL
Status: COMPLETED | OUTPATIENT
Start: 2025-07-07 | End: 2025-07-07

## 2025-07-07 RX ORDER — FENTANYL CITRATE 50 UG/ML
25 INJECTION, SOLUTION INTRAMUSCULAR; INTRAVENOUS EVERY 5 MIN PRN
Refills: 0 | Status: DISCONTINUED | OUTPATIENT
Start: 2025-07-07 | End: 2025-07-07 | Stop reason: HOSPADM

## 2025-07-07 RX ORDER — FENTANYL CITRATE 50 UG/ML
100 INJECTION, SOLUTION INTRAMUSCULAR; INTRAVENOUS EVERY 5 MIN PRN
Refills: 0 | Status: DISCONTINUED | OUTPATIENT
Start: 2025-07-07 | End: 2025-07-07

## 2025-07-07 RX ORDER — PROCHLORPERAZINE EDISYLATE 5 MG/ML
5 INJECTION INTRAMUSCULAR; INTRAVENOUS ONCE
Status: COMPLETED | OUTPATIENT
Start: 2025-07-07 | End: 2025-07-07

## 2025-07-07 RX ORDER — SODIUM CHLORIDE 0.9 % (FLUSH) 0.9 %
10 SYRINGE (ML) INJECTION
Status: DISCONTINUED | OUTPATIENT
Start: 2025-07-07 | End: 2025-07-07 | Stop reason: HOSPADM

## 2025-07-07 RX ORDER — ROCURONIUM BROMIDE 10 MG/ML
INJECTION, SOLUTION INTRAVENOUS
Status: DISCONTINUED | OUTPATIENT
Start: 2025-07-07 | End: 2025-07-07

## 2025-07-07 RX ORDER — MIDAZOLAM HYDROCHLORIDE 1 MG/ML
INJECTION INTRAMUSCULAR; INTRAVENOUS
Status: DISCONTINUED | OUTPATIENT
Start: 2025-07-07 | End: 2025-07-07

## 2025-07-07 RX ORDER — KETOROLAC TROMETHAMINE 30 MG/ML
INJECTION, SOLUTION INTRAMUSCULAR; INTRAVENOUS
Status: DISCONTINUED | OUTPATIENT
Start: 2025-07-07 | End: 2025-07-07

## 2025-07-07 RX ORDER — LIDOCAINE HYDROCHLORIDE 20 MG/ML
INJECTION INTRAVENOUS
Status: DISCONTINUED | OUTPATIENT
Start: 2025-07-07 | End: 2025-07-07

## 2025-07-07 RX ORDER — INDOCYANINE GREEN AND WATER 25 MG
1.25 KIT INJECTION
Status: COMPLETED | OUTPATIENT
Start: 2025-07-07 | End: 2025-07-07

## 2025-07-07 RX ADMIN — ONDANSETRON 4 MG: 2 INJECTION INTRAMUSCULAR; INTRAVENOUS at 02:07

## 2025-07-07 RX ADMIN — HYDROMORPHONE HYDROCHLORIDE 0.2 MG: 2 INJECTION, SOLUTION INTRAMUSCULAR; INTRAVENOUS; SUBCUTANEOUS at 02:07

## 2025-07-07 RX ADMIN — ROCURONIUM BROMIDE 50 MG: 10 INJECTION INTRAVENOUS at 12:07

## 2025-07-07 RX ADMIN — FENTANYL CITRATE 100 MCG: 50 INJECTION, SOLUTION INTRAMUSCULAR; INTRAVENOUS at 12:07

## 2025-07-07 RX ADMIN — ONDANSETRON 4 MG: 2 INJECTION INTRAMUSCULAR; INTRAVENOUS at 01:07

## 2025-07-07 RX ADMIN — HYDROMORPHONE HYDROCHLORIDE 0.2 MG: 2 INJECTION, SOLUTION INTRAMUSCULAR; INTRAVENOUS; SUBCUTANEOUS at 01:07

## 2025-07-07 RX ADMIN — SODIUM CHLORIDE, POTASSIUM CHLORIDE, SODIUM LACTATE AND CALCIUM CHLORIDE: 600; 310; 30; 20 INJECTION, SOLUTION INTRAVENOUS at 12:07

## 2025-07-07 RX ADMIN — ACETAMINOPHEN 1000 MG: 500 TABLET ORAL at 09:07

## 2025-07-07 RX ADMIN — DEXAMETHASONE SODIUM PHOSPHATE 4 MG: 4 INJECTION, SOLUTION INTRAMUSCULAR; INTRAVENOUS at 01:07

## 2025-07-07 RX ADMIN — PROCHLORPERAZINE EDISYLATE 5 MG: 5 INJECTION INTRAMUSCULAR; INTRAVENOUS at 02:07

## 2025-07-07 RX ADMIN — SUGAMMADEX 200 MG: 100 INJECTION, SOLUTION INTRAVENOUS at 01:07

## 2025-07-07 RX ADMIN — FENTANYL CITRATE 25 MCG: 50 INJECTION INTRAMUSCULAR; INTRAVENOUS at 02:07

## 2025-07-07 RX ADMIN — SODIUM CHLORIDE, POTASSIUM CHLORIDE, SODIUM LACTATE AND CALCIUM CHLORIDE 1000 ML: 600; 310; 30; 20 INJECTION, SOLUTION INTRAVENOUS at 09:07

## 2025-07-07 RX ADMIN — KETOROLAC TROMETHAMINE 30 MG: 30 INJECTION, SOLUTION INTRAMUSCULAR at 01:07

## 2025-07-07 RX ADMIN — CEFAZOLIN 2 G: 2 INJECTION, POWDER, FOR SOLUTION INTRAMUSCULAR; INTRAVENOUS at 12:07

## 2025-07-07 RX ADMIN — INDOCYANINE GREEN AND WATER 1.25 MG: KIT at 11:07

## 2025-07-07 RX ADMIN — LIDOCAINE HYDROCHLORIDE 100 MG: 20 INJECTION, SOLUTION INTRAVENOUS at 12:07

## 2025-07-07 RX ADMIN — MIDAZOLAM HYDROCHLORIDE 2 MG: 1 INJECTION INTRAMUSCULAR; INTRAVENOUS at 12:07

## 2025-07-07 RX ADMIN — PROPOFOL 150 MG: 10 INJECTION, EMULSION INTRAVENOUS at 12:07

## 2025-07-07 NOTE — TRANSFER OF CARE
Anesthesia Transfer of Care Note    Patient: Jasson Velasco    Procedure(s) Performed: Procedure(s) (LRB):  CHOLECYSTECTOMY, LAPAROSCOPIC (N/A)    Patient location: PACU    Anesthesia Type: general    Transport from OR: Transported from OR on room air with adequate spontaneous ventilation    Post pain: adequate analgesia    Post assessment: no apparent anesthetic complications and tolerated procedure well    Post vital signs: stable    Level of consciousness: awake    Nausea/Vomiting: no nausea/vomiting    Complications: none    Transfer of care protocol was followed      Last vitals: Visit Vitals  BP (!) 155/100   Pulse 72   Temp 36.7 °C (98 °F)   Resp 19   Wt 92.8 kg (204 lb 8 oz)   SpO2 99%   BMI 29.34 kg/m²

## 2025-07-07 NOTE — INTERVAL H&P NOTE
The patient has been examined and the H&P has been reviewed:    I concur with the findings and no changes have occurred since H&P was written.    Surgery risks, benefits and alternative options discussed and understood by patient/family.    Proceed to OR for laparoscopic cholecystectomy today.     There are no hospital problems to display for this patient.

## 2025-07-07 NOTE — DISCHARGE INSTRUCTIONS
[facility]  Office # [phone]    Discharge Instructions for Same Day Surgery     Call the office for and appointment if one has not already been made.     Diet: Drink plenty of fluids the first 48 hours and you may resume your   usual diet.     Activity: No heavy lifting (over 10 pounds), pushing or pulling until your   post op visit. Your doctor's office may have told you to limit your lifting to less weight, or even no weight.  Be sure to follow those instructions.    Note: You may ride in a car and you may drive when comfortable.     Do not drive, drink alcohol, or sign legal documents for 24 hours, or if taking narcotic pain medication.    Dressings: Remove the dressing 24 hours after surgery. You may shower  24 hours after surgery and you may wash your hair.     If you have steri strips ( appears to be strips of white tape) on   your incision, leave them on. In 5-7 days they will begin to fall off.    Medical: Call the doctor for any of the following problems: fever above 101,   severe pain, bleeding, or abdominal distention (swelling).   If constipated you may take any stool softener you choose.     Occasionally small areas of skin numbness or an unpleasant skin sensation can result. Also, you may find that your incision is swollen and tender for a few days.  Some redness around sutures and staples is a normal reaction, but if the discomfort persists or worsens, call you doctor.   Fall Prevention  Millions of people fall every year and injure themselves. You may have had anesthesia or sedation which may increase your risk of falling. You may have health issues that put you at an increased risk of falling.     Here are ways to reduce your risk of falling.    Make your home safe by keeping walkways clear of objects you may trip over.  Use non-slip pads under rugs. Do not use area rugs or small throw rugs.  Use non-slip mats in bathtubs and showers.  Install handrails and lights on  staircases.  Do not walk in poorly lit areas.  Do not stand on chairs or wobbly ladders.  Use caution when reaching overhead or looking upward. This position can cause a loss of balance.  Be sure your shoes fit properly, have non-slip bottoms and are in good condition.   Wear shoes both inside and out. Avoid going barefoot or wearing slippers.  Be cautious when going up and down stairs, curbs, and when walking on uneven sidewalks.  If your balance is poor, consider using a cane or walker.  If your fall was related to alcohol use, stop or limit alcohol intake.   If your fall was related to use of sleeping medicines, talk to your doctor about this. You may need to reduce your dosage at bedtime if you awaken during the night to go to the bathroom.    To reduce the need for nighttime bathroom trips:  Avoid drinking fluids for several hours before going to bed  Empty your bladder before going to bed  Men can keep a urinal at the bedside  Stay as active as you can. Balance, flexibility, strength, and endurance all come from exercise. They all play a role in preventing falls. Ask your healthcare provider which types of activity are right for you.  Get your vision checked on a regular basis.  If you have pets, know where they are before you stand up or walk so you don't trip over them.  Use night lights.      Dermabond / Prineotape    or a material like it was used on your incision.   It is like a liquid glue.   Do not peel or try to remove it it will start to fall off in 7-10 days on its' own.   It is OK to shower, pat dry, do not apply any creams or lotions.    No tub baths, swimming pools, hot tubs or submersion of the incision until your surgeon says it's ok.           Exparel Discharge Information:      To help control your pain after surgery, your surgeon injected Exparel (bupicacaine liposome injectable suspension) into your surgical incision just before the end of the procedure.      Exparel is a local analgesic  that contains the local anesthetic bupivacaine..  Local anesthetics provide pain relief by numbing the tissue around the surgical site.    Exparel is specifically designed to release pain medication over time an can control pain for up to 72 hours.    In addition to Exparel, your surgeon may provide other pain medications to control your pain.    Each patient is different and responds differently to pain medication.  Depending on how you respond to Exparel, you may require less additional pain medication during your recovery.    Side effects can occur with any medication and it is important not to ignore anything you may be experiencing.  Some patients who received Exparel experienced nausea, vomiting, or constipation.  Rarely, patients who receive bupivacaine (the active ingredient in Exparel) have experienced numbness and tingling in their mouth or lips, lightheadedness, or anxiety.  Speak with your doctor right away if you think you may be experiencing any of these sensations, or if you have other questions regarding possible side effects.    Products that contain bupivacaine, like Exparel, may cause a temporary loss of sensation or the ability to move in the area where bupivacaine was injected.    Other formulations of bupivacaine should not be administered within 96 hours following administrations of Exparel.      Do not remove the teal colored bracelet that you have on for 96 hours.  (4 DAYS)    This bracelet will let other health care workers know that you have received Exparel, and not to give you bupivacaine during this 96 hours.

## 2025-07-07 NOTE — ANESTHESIA PROCEDURE NOTES
Intubation    Date/Time: 7/7/2025 12:46 PM    Performed by: Jose Escoto CRNA  Authorized by: Nirav Wray Chi, MD    Intubation:     Induction:  Intravenous    Intubated:  Postinduction    Mask Ventilation:  Easy mask    Attempts:  1    Attempted By:  Staff anesthesiologist    Method of Intubation:  Video laryngoscopy    Blade:  Chinchilla 4    Laryngeal View Grade: Grade I - full view of cords      Difficult Airway Encountered?: No      Complications:  None    Airway Device:  Oral endotracheal tube    Airway Device Size:  7.5    Style/Cuff Inflation:  Cuffed    Inflation Amount (mL):  5    Tube secured:  21    Secured at:  The teeth    Placement Verified By:  Capnometry    Complicating Factors:  None    Findings Post-Intubation:  BS equal bilateral and atraumatic/condition of teeth unchanged

## 2025-07-07 NOTE — DISCHARGE SUMMARY
[facility] - Surgery  Discharge Note  Short Stay    Procedure(s) (LRB):  CHOLECYSTECTOMY, LAPAROSCOPIC (N/A)      OUTCOME: Patient tolerated treatment/procedure well without complication and is now ready for discharge.    DISPOSITION: Home or Self Care    FINAL DIAGNOSIS: gallstones    FOLLOWUP: In clinic    DISCHARGE INSTRUCTIONS:    Discharge Procedure Orders   Diet Adult Regular     Lifting restrictions   Order Comments: No heavy lifting for 2 weeks, less than 15 lbs.     Notify your health care provider if you experience any of the following:  temperature >100.4     Notify your health care provider if you experience any of the following:  persistent nausea and vomiting or diarrhea     Notify your health care provider if you experience any of the following:  severe uncontrolled pain     Notify your health care provider if you experience any of the following:  redness, tenderness, or signs of infection (pain, swelling, redness, odor or green/yellow discharge around incision site)     No dressing needed        TIME SPENT ON DISCHARGE: 12 minutes

## 2025-07-07 NOTE — OP NOTE
[facility]  General Surgery  Operative Note    SUMMARY     Date of Procedure: 7/7/2025     Procedure: Laparoscopic Cholecystectomy    Attending Surgeon: Chris Villeda Surgeon: Nina Redman MD    Pre-Operative Diagnosis:   gallstones     Post-Operative Diagnosis:   same    Anesthesia: General    Indications: Jasson Velasco is a 35 y.o..male who presented with a history of symptomatic gallstones.    Procedure in detail:  The patient was seen again in the Holding Room. The risks, benefits, complications, treatment options, and expected outcomes were discussed with the patient. The possibilities of reaction to medication, pulmonary aspiration, perforation of viscus, bleeding, recurrent infection, the need for additional procedures, and development of a complication requiring transfusion or further operation were discussed with the patient and/or family. There was concurrence with the proposed plan, and informed consent was obtained. The patient was taken to the Operating Room, identified as Jasson Velasco, and the procedure verified as cholecystectomy. A Time Out was held and the above information confirmed.    The patient was placed in the supine position and underwent induction of anesthesia, the abdomen was prepped and draped in the standard fashion.   Pre op local anesthesia given, 30 mL total of Exparel mixed with bupivicaine. A supra-umbilical incision was made.  This was taken down to fascia which was incised and entered. A 12 mm balloon trocar was placed and pneumoperitoneum was achieved. 5 mm 30 degree camera placed and 3x 5 mm trocars placed in the subxiphoid, RUQ and right flank locations. We retracted the gallbladder cephalad and laterally retracted the infundibulum.  Blunt and cautery dissection used to identify cystic duct and cystic artery. These were each clipped 2x proximally and once distally and transected. Cautery was used to remove the gallbladder from the fossa on the liver bed. no  bile spillage noted. no suction irrigation used.    We then removed the gallbladder with an endocatch bag. we were noted to be hemostatic on the liver fossa.    All trocars removed. The umbilical incision site closed with 0 ethibond sutures, interrupted, at the fascia. All skin closed with 4-0 monocryl subcuticular suture. All counts correct x2. Tolerated well.    Description of the Findings of the Procedure:  normal appearing gallbladder  ICG used for anatomy verification  Critical view of safety    Complications: No    Estimated Blood Loss (EBL): * No values recorded between 7/7/2025 12:33 PM and 7/7/2025  1:34 PM *           Implants: * No implants in log *     Specimens:   Specimen (24h ago, onward)       Start     Ordered    07/07/25 1315  Specimen to Pathology  RELEASE UPON ORDERING        References:    Click here for ordering Quick Tip   Question:  Release to patient  Answer:  Immediate    07/07/25 1315                   Condition: Good    Disposition: PACU - hemodynamically stable.    Attestation: I was present and scrubbed for the entire procedure.

## 2025-07-08 VITALS
WEIGHT: 204.5 LBS | BODY MASS INDEX: 29.34 KG/M2 | HEART RATE: 72 BPM | DIASTOLIC BLOOD PRESSURE: 75 MMHG | RESPIRATION RATE: 18 BRPM | SYSTOLIC BLOOD PRESSURE: 158 MMHG | OXYGEN SATURATION: 95 % | TEMPERATURE: 98 F

## 2025-07-08 NOTE — ANESTHESIA POSTPROCEDURE EVALUATION
Anesthesia Post Evaluation    Patient: Jasson Velasco    Procedure(s) Performed: Procedure(s) (LRB):  CHOLECYSTECTOMY, LAPAROSCOPIC (N/A)    Final Anesthesia Type: general      Patient location during evaluation: PACU  Patient participation: Yes- Able to Participate  Level of consciousness: awake and alert  Post-procedure vital signs: reviewed and stable  Pain management: adequate  Airway patency: patent    PONV status at discharge: No PONV  Anesthetic complications: no      Cardiovascular status: hemodynamically stable  Respiratory status: spontaneous ventilation and unassisted  Hydration status: euvolemic  Follow-up not needed.              Vitals Value Taken Time   /75 07/07/25 15:26   Temp 36.4 °C (97.5 °F) 07/07/25 15:26   Pulse 72 07/07/25 15:26   Resp 18 07/07/25 15:26   SpO2 95 % 07/07/25 15:26         Event Time   Out of Recovery 15:20:38         Pain/Guille Score: Pain Rating Prior to Med Admin: 8 (7/7/2025  2:55 PM)  Pain Rating Post Med Admin: 6 (7/7/2025  3:00 PM)  Guille Score: 9 (7/7/2025  3:26 PM)  Modified Guille Score: 18 (7/7/2025  3:26 PM)

## 2025-07-09 LAB
ESTROGEN SERPL-MCNC: NORMAL PG/ML
INSULIN SERPL-ACNC: NORMAL U[IU]/ML
LAB AP CLINICAL INFORMATION: NORMAL
LAB AP GROSS DESCRIPTION: NORMAL
LAB AP PERFORMING LOCATION(S): NORMAL
LAB AP REPORT FOOTNOTES: NORMAL

## (undated) DEVICE — CONNECTOR TUBING STR 5 IN 1

## (undated) DEVICE — GOWN NONREINF SET-IN SLV XL

## (undated) DEVICE — ELECTRODE REM PLYHSV RETURN 9

## (undated) DEVICE — SUT HOOK RIGHT 60DEG

## (undated) DEVICE — PACK ENDOSCOPY GENERAL

## (undated) DEVICE — TAPE MEDIPORE 4IN X 2YDS

## (undated) DEVICE — GAUZE SPONGE 4X4 12PLY

## (undated) DEVICE — BAG TISS RETRV MONARCH 10MM

## (undated) DEVICE — ADHESIVE DERMABOND MINI HV

## (undated) DEVICE — BLANKET WARMING UPPER BODY

## (undated) DEVICE — KITTNER ENDOSCOPIC SINGLE TIP

## (undated) DEVICE — KIT FIBERTAK CURVED SPEAR 1.8M

## (undated) DEVICE — TOWEL OR DISP STRL BLUE 4/PK

## (undated) DEVICE — SOL NACL IRR 3000ML

## (undated) DEVICE — PAD ABDOMINAL STERILE 8X10IN

## (undated) DEVICE — SET TUBE PNEUMOCLEAR SE HI FLO

## (undated) DEVICE — SOL NORMAL USPCA 0.9%

## (undated) DEVICE — ELECTRODE EZ CLEAN L-HOOK 13.5

## (undated) DEVICE — COVER CAMERA OPERATING ROOM

## (undated) DEVICE — SUT 1 36IN PDS II VIO MONO

## (undated) DEVICE — SUT MONOCRYL 4.0 PS2 CP496G

## (undated) DEVICE — KIT ANTIFOG

## (undated) DEVICE — PAD ABD 8X10 STERILE

## (undated) DEVICE — DISSECTOR CURVED 5DCD

## (undated) DEVICE — COVER OVERHEAD SURG LT BLUE

## (undated) DEVICE — Device

## (undated) DEVICE — TUBE SET INFLOW/OUTFLOW

## (undated) DEVICE — NDL SPINAL 18GX3.5 SPINOCAN

## (undated) DEVICE — GOWN ECLIPSE REINF LV4 TWL 2XL

## (undated) DEVICE — PROBE ARTHO ENERGY 90 DEG

## (undated) DEVICE — SUT HOOK LEFT 60 DEG

## (undated) DEVICE — CANNULA ARTHRO 8.25MM X 7CM

## (undated) DEVICE — NDL HYPO REG 25G X 1 1/2

## (undated) DEVICE — DRAPE STERI INSTRUMENT 1018

## (undated) DEVICE — SYR B-D DISP CONTROL 10CC100/C

## (undated) DEVICE — SCISSOR CURVED ENDOPATH 5MM

## (undated) DEVICE — TUBING SUC UNIV W/CONN 12FT

## (undated) DEVICE — SUT HOOK CRESENT MED SP

## (undated) DEVICE — PAD ELECTRODE STER 1.5X3

## (undated) DEVICE — GLOVE SENSICARE PI GRN 7.5

## (undated) DEVICE — BNDG COFLEX FOAM LF2 ST 6X5YD

## (undated) DEVICE — CLIP HEMO-LOK ML

## (undated) DEVICE — CLOSURE SKIN STERI STRIP 1/2X4

## (undated) DEVICE — KIT FIBERTAK PERC INSRT 1.8MM

## (undated) DEVICE — SUT ETHIBOND EXCEL 0 MO6 18

## (undated) DEVICE — TROCAR ENDOPATH XCEL 5X100MM

## (undated) DEVICE — DRESSING XEROFORM NONADH 1X8IN

## (undated) DEVICE — KIT SHOULDER POSITIONER SPIDER

## (undated) DEVICE — GLOVE SIGNATURE ESSNTL LTX 7

## (undated) DEVICE — STRIP MEDI WND CLSR 1/2X4IN

## (undated) DEVICE — DRAPE STERI U-SHAPED 47X51IN

## (undated) DEVICE — SYR 10CC LUER LOCK

## (undated) DEVICE — WRAP SHLDR HIP ACCU THRM PACK

## (undated) DEVICE — NDL 18GA X1 1/2 REG BEVEL

## (undated) DEVICE — BLADE SHAVER 4.5 6/BX

## (undated) DEVICE — SOL IRR SOD CHL .9% POUR

## (undated) DEVICE — TROCAR KII BLLN 12MM 10CM

## (undated) DEVICE — COVER LIGHT HANDLE 80/CA

## (undated) DEVICE — CANNULA TRIPLEDAM 6MM X 7CM

## (undated) DEVICE — SOL NACL IRR 1000ML BTL

## (undated) DEVICE — PAD DERMAPROX THCK 11X15X1IN

## (undated) DEVICE — GLOVE BIOGEL SKINSENSE PI 7.0

## (undated) DEVICE — APPLICATOR CHLORAPREP ORN 26ML

## (undated) DEVICE — GLOVE ORTHO PF SZ 8.5

## (undated) DEVICE — SUT MCRYL PLUS 4-0 PS2 27IN

## (undated) DEVICE — DRESSING XEROFORM 1X8IN

## (undated) DEVICE — ADHESIVE MASTISOL VIAL 48/BX

## (undated) DEVICE — GLOVE SURGEON SYN PF SZ 9

## (undated) DEVICE — SYR 30CC LUER LOCK

## (undated) DEVICE — GOWN ECLIPSE REINF LVL4 TWL XL